# Patient Record
Sex: FEMALE | Race: WHITE | NOT HISPANIC OR LATINO | Employment: UNEMPLOYED | URBAN - METROPOLITAN AREA
[De-identification: names, ages, dates, MRNs, and addresses within clinical notes are randomized per-mention and may not be internally consistent; named-entity substitution may affect disease eponyms.]

---

## 2021-01-29 ENCOUNTER — OFFICE VISIT (OUTPATIENT)
Dept: OBSTETRICS AND GYNECOLOGY | Facility: CLINIC | Age: 26
End: 2021-01-29

## 2021-01-29 VITALS
BODY MASS INDEX: 20.1 KG/M2 | HEIGHT: 66 IN | DIASTOLIC BLOOD PRESSURE: 64 MMHG | WEIGHT: 125.1 LBS | SYSTOLIC BLOOD PRESSURE: 102 MMHG

## 2021-01-29 DIAGNOSIS — O26.849 UTERINE SIZE DATE DISCREPANCY PREGNANCY: ICD-10-CM

## 2021-01-29 DIAGNOSIS — Z34.90 PREGNANCY, UNSPECIFIED GESTATIONAL AGE: ICD-10-CM

## 2021-01-29 DIAGNOSIS — Z01.419 ENCOUNTER FOR GYNECOLOGICAL EXAMINATION: Primary | ICD-10-CM

## 2021-01-29 DIAGNOSIS — O21.0 MILD HYPEREMESIS GRAVIDARUM: ICD-10-CM

## 2021-01-29 DIAGNOSIS — O20.0 THREATENED ABORTION: ICD-10-CM

## 2021-01-29 PROCEDURE — 99214 OFFICE O/P EST MOD 30 MIN: CPT | Performed by: OBSTETRICS & GYNECOLOGY

## 2021-01-29 PROCEDURE — 99395 PREV VISIT EST AGE 18-39: CPT | Performed by: OBSTETRICS & GYNECOLOGY

## 2021-01-29 RX ORDER — PRENATAL VIT,CAL 76/IRON/FOLIC 29 MG-1 MG
1 TABLET ORAL DAILY
Qty: 30 TABLET | Refills: 11 | Status: SHIPPED | OUTPATIENT
Start: 2021-01-29 | End: 2021-02-28

## 2021-01-29 RX ORDER — PROMETHAZINE HYDROCHLORIDE 25 MG/1
25 TABLET ORAL EVERY 6 HOURS PRN
Qty: 24 TABLET | Refills: 2 | Status: SHIPPED | OUTPATIENT
Start: 2021-01-29 | End: 2021-09-07 | Stop reason: HOSPADM

## 2021-01-29 NOTE — PROGRESS NOTES
GYN Annual Exam     CC - Here for annual exam.        HPI  Brittny Hill is a 25 y.o. female, , who presents for annual well woman exam. Patient's last menstrual period was 2020..  Periods are regular every 25-35 days, lasting 7 days. .  Dysmenorrhea:moderate, occurring first 1-2 days of flow.  Patient reports problems with: none.  Partner Status: Marital Status: .  Desires STD Screening: no.  Patient states that her periods are usually regular every 28 days however ultrasound today shows that she is 6 weeks and 3 days and she would have been 10 weeks by her last menstrual period.    Additional OB/GYN History   Current contraception: contraceptive methods: None  Desires to: do not start contraception  Last Pap : 2018 per pt and normal  Last Completed Pap Smear       Status Date      PAP SMEAR No completions recorded        History of abnormal Pap smear: no  Family history of uterine, colon, breast, or ovarian cancer: no  Performs monthly Self-Breast Exam: no  Exercises Regularly:yes  Feelings of Anxiety or Depression: no  Tobacco Usage?: No   OB History        2    Para   1    Term   1       0    AB   0    Living   1       SAB   0    TAB   0    Ectopic   0    Molar   0    Multiple   0    Live Births   1                Health Maintenance   Topic Date Due   • Annual Gynecologic Pelvic and Breast Exam  1996   • ANNUAL PHYSICAL  1999   • HPV VACCINES (1 - 2-dose series) 2007   • TDAP/TD VACCINES (1 - Tdap) 2015   • INFLUENZA VACCINE  2020   • HEPATITIS C SCREENING  2021   • CHLAMYDIA SCREENING  2021   • PAP SMEAR  2021   • Pneumococcal Vaccine 0-64  Aged Out   • MENINGOCOCCAL VACCINE  Aged Out       The additional following portions of the patient's history were reviewed and updated as appropriate: allergies, current medications, past family history, past medical history, past social history, past surgical history and problem  "list.    Review of Systems   Constitutional: Negative for chills and fever.   Respiratory: Negative for shortness of breath.    Cardiovascular: Negative for chest pain.   Gastrointestinal: Positive for nausea and vomiting. Negative for abdominal distention, abdominal pain and diarrhea.   Genitourinary: Negative for breast discharge, breast lump, dysuria, pelvic pain, pelvic pressure, vaginal bleeding and vaginal discharge.   All other systems reviewed and are negative.    All other systems reviewed and are negative.     I have reviewed and agree with the HPI, ROS, and historical information as entered above. Brandon Holm MD    Objective   /64 (BP Location: Left arm, Patient Position: Sitting, Cuff Size: Adult)   Ht 167.6 cm (66\")   Wt 56.7 kg (125 lb 1.6 oz)   LMP 11/20/2020   Breastfeeding No   BMI 20.19 kg/m²     Physical Exam  Vitals signs and nursing note reviewed. Exam conducted with a chaperone present.   Constitutional:       Appearance: She is well-developed.   HENT:      Head: Normocephalic and atraumatic.   Neck:      Musculoskeletal: Normal range of motion. No muscular tenderness.      Thyroid: No thyroid mass or thyromegaly.   Cardiovascular:      Rate and Rhythm: Normal rate and regular rhythm.      Heart sounds: No murmur.   Pulmonary:      Effort: Pulmonary effort is normal. No retractions.      Breath sounds: Normal breath sounds. No wheezing, rhonchi or rales.   Chest:      Chest wall: No mass or tenderness.      Breasts:         Right: Normal. No mass, nipple discharge, skin change or tenderness.         Left: Normal. No mass, nipple discharge, skin change or tenderness.   Abdominal:      General: Bowel sounds are normal.      Palpations: Abdomen is soft. Abdomen is not rigid. There is no mass.      Tenderness: There is no abdominal tenderness. There is no guarding.      Hernia: No hernia is present. There is no hernia in the left inguinal area.   Genitourinary:     Labia:         " Right: No rash, tenderness or lesion.         Left: No rash, tenderness or lesion.       Vagina: Normal. No vaginal discharge or lesions.      Cervix: No cervical motion tenderness, discharge, lesion or cervical bleeding.      Uterus: Normal. Not enlarged, not fixed and not tender.       Adnexa:         Right: No mass or tenderness.          Left: No mass or tenderness.        Rectum: No external hemorrhoid.      Comments: Uterus anteverted with approxi-7-week size.  Neurological:      Mental Status: She is alert and oriented to person, place, and time.   Psychiatric:         Behavior: Behavior normal.            Assessment and Plan    Problem List Items Addressed This Visit     Pregnancy    Overview     Dates by 6 3/7 wk u/s.   Repeat u/s at 9 wks.   Prior  2018 ; boy; 7 pounds 10 ounces         Relevant Orders    US Ob Transvaginal    CBC (No Diff)    ABO / Rh    Progesterone    Uterine size date discrepancy pregnancy    Relevant Orders    US Ob Transvaginal    Mild hyperemesis gravidarum    Overview     Discussed OTC Unisom and Vit B6  Rx phenergan given.            Other Visit Diagnoses     Encounter for gynecological examination    -  Primary    Relevant Orders    Pap IG, Ct-Ng TV Rfx HPV All    Threatened               1. GYN annual well woman exam.  Pap with GC chlamydia performed.    2. Pregnancy.  Patient's last menstrual period was 2020.  Ultrasound today shows viable intrauterine pregnancy measuring 6 weeks and 3 days.  Repeat ultrasound in 3 weeks and new OB labs and teaching after ultrasound.  3. Reviewed monthly self breast exams.  Instructed to call with lumps, pain, or breast discharge.    4. Reccommended Flu Vaccine in Fall of each year.  5. Other: Rx prenatal vitamin.      Brandon Holm MD  2021

## 2021-01-29 NOTE — PROGRESS NOTES
"Chief Complaint   Patient presents with   • TAB   • Gynecologic Exam          HPI  Brittny Hill is a 25 y.o. female, , who presents with being 6w 3d pregnant.    Recent Tests:  She had a urine pregnancy test that was done 2 weeks ago that was positive..  She had a pelvic ultrasound today and the findings were fetal pole with heartbeat >100..  She has not had prenatal care.  She denies associated abdominal or pelvic pain.. Her past medical history is non-contributory.  She does not have passage of tissue.  Rh Status: Unknown  She reports no additional symptoms or complaints.    The additional following portions of the patient's history were reviewed and updated as appropriate: allergies, current medications, past family history, past medical history, past social history, past surgical history and problem list.    Review of Systems   Constitutional: Negative for unexpected weight gain and unexpected weight loss.   Gastrointestinal: Positive for nausea and vomiting. Negative for abdominal distention and abdominal pain.   Genitourinary: Negative for pelvic pain, pelvic pressure, vaginal bleeding and vaginal discharge.   All other systems reviewed and are negative.    All other systems reviewed and are negative.     I have reviewed and agree with the HPI, ROS, and historical information as entered above. Brandon Holm MD    Objective   /64 (BP Location: Left arm, Patient Position: Sitting, Cuff Size: Adult)   Ht 167.6 cm (66\")   Wt 56.7 kg (125 lb 1.6 oz)   LMP 2020   Breastfeeding No   BMI 20.19 kg/m²     Physical Exam  Vitals signs and nursing note reviewed. Exam conducted with a chaperone present.   Constitutional:       Appearance: She is well-developed.   HENT:      Head: Normocephalic and atraumatic.   Neck:      Musculoskeletal: Normal range of motion. No muscular tenderness.      Thyroid: No thyroid mass or thyromegaly.   Cardiovascular:      Rate and Rhythm: Normal rate and " regular rhythm.      Heart sounds: No murmur.   Pulmonary:      Effort: Pulmonary effort is normal. No retractions.      Breath sounds: Normal breath sounds. No wheezing, rhonchi or rales.   Chest:      Chest wall: No mass or tenderness.      Breasts:         Right: Normal. No mass, nipple discharge, skin change or tenderness.         Left: Normal. No mass, nipple discharge, skin change or tenderness.   Abdominal:      General: Bowel sounds are normal.      Palpations: Abdomen is soft. Abdomen is not rigid. There is no mass.      Tenderness: There is no abdominal tenderness. There is no guarding.      Hernia: No hernia is present. There is no hernia in the left inguinal area.   Genitourinary:     Labia:         Right: No rash, tenderness or lesion.         Left: No rash, tenderness or lesion.       Vagina: Normal. No vaginal discharge or lesions.      Cervix: No cervical motion tenderness, discharge, lesion or cervical bleeding.      Uterus: Normal. Not enlarged, not fixed and not tender.       Adnexa:         Right: No mass or tenderness.          Left: No mass or tenderness.        Rectum: No external hemorrhoid.      Comments: Uterus anteverted approximately 7 weeks size.  No uterine or adnexal tenderness.  Neurological:      Mental Status: She is alert and oriented to person, place, and time.   Psychiatric:         Behavior: Behavior normal.            Assessment and Plan    Problem List Items Addressed This Visit     Pregnancy    Overview     Dates by 6 3/7 wk u/s.   Repeat u/s at 9 wks.   Prior  2018 ; boy; 7 pounds 10 ounces         Relevant Orders    US Ob Transvaginal    CBC (No Diff)    ABO / Rh    Progesterone    Uterine size date discrepancy pregnancy    Relevant Orders    US Ob Transvaginal    Mild hyperemesis gravidarum    Overview     Discussed OTC Unisom and Vit B6  Rx phenergan given.            Other Visit Diagnoses     Encounter for gynecological examination    -  Primary    Relevant Orders     Pap IG, Ct-Ng TV Rfx HPV All    Threatened               1. Threatened AB and Size less than dates.  Ultrasound shows viable 6-week and 3-day pregnancy  2. Repeat U/S in 3 week(s).  Will need new OB labs and teaching and urine culture after ultrasound.  Return in about 3 weeks (around 2021) for Follow-up ultrasound.    Counseling was given to patient for the following topics: diagnostic results and patient and family education .       Brandon Holm MD  2021

## 2021-01-30 LAB
ABO GROUP BLD: NORMAL
ERYTHROCYTE [DISTWIDTH] IN BLOOD BY AUTOMATED COUNT: 12 % (ref 12.3–15.4)
HCT VFR BLD AUTO: 37.6 % (ref 34–46.6)
HGB BLD-MCNC: 12.6 G/DL (ref 12–15.9)
MCH RBC QN AUTO: 30.4 PG (ref 26.6–33)
MCHC RBC AUTO-ENTMCNC: 33.5 G/DL (ref 31.5–35.7)
MCV RBC AUTO: 90.8 FL (ref 79–97)
PLATELET # BLD AUTO: 248 10*3/MM3 (ref 140–450)
PROGEST SERPL-MCNC: 10.9 NG/ML
RBC # BLD AUTO: 4.14 10*6/MM3 (ref 3.77–5.28)
RH BLD: POSITIVE
WBC # BLD AUTO: 5.56 10*3/MM3 (ref 3.4–10.8)

## 2021-02-02 ENCOUNTER — TELEPHONE (OUTPATIENT)
Dept: OBSTETRICS AND GYNECOLOGY | Facility: CLINIC | Age: 26
End: 2021-02-02

## 2021-02-02 PROBLEM — O20.0 THREATENED ABORTION: Status: ACTIVE | Noted: 2021-02-02

## 2021-02-10 DIAGNOSIS — Z01.419 ENCOUNTER FOR GYNECOLOGICAL EXAMINATION: ICD-10-CM

## 2021-02-19 ENCOUNTER — INITIAL PRENATAL (OUTPATIENT)
Dept: OBSTETRICS AND GYNECOLOGY | Facility: CLINIC | Age: 26
End: 2021-02-19

## 2021-02-19 VITALS — BODY MASS INDEX: 19.69 KG/M2 | WEIGHT: 122 LBS | SYSTOLIC BLOOD PRESSURE: 98 MMHG | DIASTOLIC BLOOD PRESSURE: 56 MMHG

## 2021-02-19 DIAGNOSIS — Z12.4 SCREENING FOR CERVICAL CANCER: ICD-10-CM

## 2021-02-19 DIAGNOSIS — O20.0 THREATENED ABORTION: ICD-10-CM

## 2021-02-19 DIAGNOSIS — O26.849 UTERINE SIZE DATE DISCREPANCY PREGNANCY: ICD-10-CM

## 2021-02-19 DIAGNOSIS — Z34.90 PREGNANCY, UNSPECIFIED GESTATIONAL AGE: ICD-10-CM

## 2021-02-19 DIAGNOSIS — O21.0 MILD HYPEREMESIS GRAVIDARUM: ICD-10-CM

## 2021-02-19 DIAGNOSIS — Z34.91 INITIAL OBSTETRIC VISIT IN FIRST TRIMESTER: Primary | ICD-10-CM

## 2021-02-19 PROCEDURE — 99214 OFFICE O/P EST MOD 30 MIN: CPT | Performed by: OBSTETRICS & GYNECOLOGY

## 2021-02-19 RX ORDER — SWAB
SWAB, NON-MEDICATED MISCELLANEOUS
COMMUNITY
Start: 2021-02-17 | End: 2023-02-08

## 2021-02-19 RX ORDER — CALCIUM CARBONATE, CHOLECALCIFEROL, MAGNESIUM OXIDE, BORON, FOLIC ACID, PYRIDOXINE HYDROCHLORIDE, CYANOCOBALAMIN, BIOTIN AND BILBERRY 500; 300; 50; 250; 1; 10; 125; 280; 25 MG/1; [IU]/1; MG/1; UG/1; MG/1; MG/1; MG/1; UG/1; MG/1
1 TABLET, CHEWABLE ORAL DAILY
Qty: 30 TABLET | Refills: 12 | Status: SHIPPED | OUTPATIENT
Start: 2021-02-19 | End: 2021-09-07 | Stop reason: HOSPADM

## 2021-02-21 LAB
ABO GROUP BLD: NORMAL
AMPHETAMINES UR QL SCN: NEGATIVE NG/ML
BACTERIA UR CULT: NORMAL
BACTERIA UR CULT: NORMAL
BARBITURATES UR QL SCN: NEGATIVE NG/ML
BASOPHILS # BLD AUTO: 0 X10E3/UL (ref 0–0.2)
BASOPHILS NFR BLD AUTO: 0 %
BENZODIAZ UR QL SCN: NEGATIVE NG/ML
BLD GP AB SCN SERPL QL: NEGATIVE
BZE UR QL SCN: NEGATIVE NG/ML
CANNABINOIDS UR QL SCN: NEGATIVE NG/ML
CREAT UR-MCNC: 204.2 MG/DL (ref 20–300)
EOSINOPHIL # BLD AUTO: 0 X10E3/UL (ref 0–0.4)
EOSINOPHIL NFR BLD AUTO: 0 %
ERYTHROCYTE [DISTWIDTH] IN BLOOD BY AUTOMATED COUNT: 12 % (ref 11.7–15.4)
HBV SURFACE AG SERPL QL IA: NEGATIVE
HCT VFR BLD AUTO: 36.7 % (ref 34–46.6)
HCV AB S/CO SERPL IA: <0.1 S/CO RATIO (ref 0–0.9)
HGB BLD-MCNC: 12.2 G/DL (ref 11.1–15.9)
HIV 1+2 AB+HIV1 P24 AG SERPL QL IA: NON REACTIVE
IMM GRANULOCYTES # BLD AUTO: 0 X10E3/UL (ref 0–0.1)
IMM GRANULOCYTES NFR BLD AUTO: 0 %
LABORATORY COMMENT REPORT: NORMAL
LYMPHOCYTES # BLD AUTO: 1.1 X10E3/UL (ref 0.7–3.1)
LYMPHOCYTES NFR BLD AUTO: 17 %
MCH RBC QN AUTO: 30 PG (ref 26.6–33)
MCHC RBC AUTO-ENTMCNC: 33.2 G/DL (ref 31.5–35.7)
MCV RBC AUTO: 90 FL (ref 79–97)
METHADONE UR QL SCN: NEGATIVE NG/ML
MONOCYTES # BLD AUTO: 0.4 X10E3/UL (ref 0.1–0.9)
MONOCYTES NFR BLD AUTO: 6 %
NEUTROPHILS # BLD AUTO: 4.6 X10E3/UL (ref 1.4–7)
NEUTROPHILS NFR BLD AUTO: 77 %
OPIATES UR QL SCN: NEGATIVE NG/ML
OXYCODONE+OXYMORPHONE UR QL SCN: NEGATIVE NG/ML
PCP UR QL: NEGATIVE NG/ML
PH UR: 5.7 [PH] (ref 4.5–8.9)
PLATELET # BLD AUTO: 232 X10E3/UL (ref 150–450)
PROPOXYPH UR QL SCN: NEGATIVE NG/ML
RBC # BLD AUTO: 4.07 X10E6/UL (ref 3.77–5.28)
RH BLD: POSITIVE
RPR SER QL: NON REACTIVE
RUBV IGG SERPL IA-ACNC: 2.67 INDEX
WBC # BLD AUTO: 6 X10E3/UL (ref 3.4–10.8)

## 2021-03-12 ENCOUNTER — ROUTINE PRENATAL (OUTPATIENT)
Dept: OBSTETRICS AND GYNECOLOGY | Facility: CLINIC | Age: 26
End: 2021-03-12

## 2021-03-12 VITALS — WEIGHT: 128.6 LBS | BODY MASS INDEX: 20.76 KG/M2 | SYSTOLIC BLOOD PRESSURE: 110 MMHG | DIASTOLIC BLOOD PRESSURE: 60 MMHG

## 2021-03-12 DIAGNOSIS — Z3A.12 12 WEEKS GESTATION OF PREGNANCY: Primary | ICD-10-CM

## 2021-03-12 DIAGNOSIS — O20.0 THREATENED ABORTION: ICD-10-CM

## 2021-03-12 DIAGNOSIS — Z12.4 SCREENING FOR CERVICAL CANCER: ICD-10-CM

## 2021-03-12 DIAGNOSIS — O21.0 MILD HYPEREMESIS GRAVIDARUM: ICD-10-CM

## 2021-03-12 LAB
GLUCOSE UR STRIP-MCNC: NEGATIVE MG/DL
PROT UR STRIP-MCNC: NEGATIVE MG/DL

## 2021-03-12 PROCEDURE — 99213 OFFICE O/P EST LOW 20 MIN: CPT | Performed by: OBSTETRICS & GYNECOLOGY

## 2021-03-12 NOTE — PROGRESS NOTES
OB FOLLOW UP  CC- Here for care of pregnancy        Brittny Hill is a 25 y.o.  12w3d patient being seen today for her obstetrical follow up visit. Patient reports nausea.  Nausea has improved.  No longer taking Phenergan.  Wants cell free DNA test done today.    Her prenatal care is complicated by (and status) :    Patient Active Problem List   Diagnosis   • Pregnancy   • Uterine size date discrepancy pregnancy   • Mild hyperemesis gravidarum   • Threatened    • Screening for cervical cancer       Desires genetic testing?: Yes with Gender  Flu Status: Declines  Ultrasound Today: No.    ROS -   Patient Reports : No Problems  Patient Denies: Loss of Fluid, Vaginal Spotting, Vision Changes, Headaches, Nausea  and Vomiting   Fetal Movement : not yet  All other systems reviewed and are negative.     The additional following portions of the patient's history were reviewed and updated as appropriate: allergies, current medications, past family history, past medical history, past social history, past surgical history and problem list.    I have reviewed and agree with the HPI, ROS, and historical information as entered above. Brandon Holm MD    /60   Wt 58.3 kg (128 lb 9.6 oz)   LMP 2020   BMI 20.76 kg/m²         EXAM:     FHT: 155 BPM   Uterine Size: size equals dates  Pelvic Exam: No       Assessment and Plan    Problem List Items Addressed This Visit     Pregnancy - Primary    Overview     Dates by 6 3/7 wk u/s.   Repeat u/s at 9 wks.   Prior  2018 ; boy; 7 pounds 10 ounces  Pap 21 negative; GC /Chl neg  Maternal blood type O+.  Prenatal  labs normal.  Cell Free DNA; done 3/12>         Relevant Orders    POC Urinalysis Dipstick (Completed)    LafinfnW64 PLUS Core+SCA+ESS - Blood,    Mild hyperemesis gravidarum    Overview     Discussed OTC Unisom and Vit B6  Rx phenergan given.          Threatened     Overview     Low progesterone level.  Rx Prometrium 200 mg  daily.         Screening for cervical cancer    Overview     Pap normal 1/29/2021               1. Pregnancy at 12w3d; Cell free DNA today;.   2. Labs reviewed from New OB Visit.  3. Activity and Exercise discussed.  Return in about 4 weeks (around 4/9/2021) for Next scheduled follow up.    Brandon Holm MD  03/12/2021

## 2021-03-19 ENCOUNTER — TELEPHONE (OUTPATIENT)
Dept: OBSTETRICS AND GYNECOLOGY | Facility: CLINIC | Age: 26
End: 2021-03-19

## 2021-04-12 ENCOUNTER — ROUTINE PRENATAL (OUTPATIENT)
Dept: OBSTETRICS AND GYNECOLOGY | Facility: CLINIC | Age: 26
End: 2021-04-12

## 2021-04-12 VITALS — WEIGHT: 142 LBS | BODY MASS INDEX: 22.92 KG/M2 | SYSTOLIC BLOOD PRESSURE: 110 MMHG | DIASTOLIC BLOOD PRESSURE: 62 MMHG

## 2021-04-12 DIAGNOSIS — O26.849 UTERINE SIZE DATE DISCREPANCY PREGNANCY: ICD-10-CM

## 2021-04-12 DIAGNOSIS — O21.0 MILD HYPEREMESIS GRAVIDARUM: ICD-10-CM

## 2021-04-12 DIAGNOSIS — Z3A.16 16 WEEKS GESTATION OF PREGNANCY: Primary | ICD-10-CM

## 2021-04-12 LAB
GLUCOSE UR STRIP-MCNC: NEGATIVE MG/DL
PROT UR STRIP-MCNC: NEGATIVE MG/DL

## 2021-04-12 PROCEDURE — 99213 OFFICE O/P EST LOW 20 MIN: CPT | Performed by: OBSTETRICS & GYNECOLOGY

## 2021-04-12 NOTE — PROGRESS NOTES
OB FOLLOW UP  CC- Here for care of pregnancy        Brittny Hill is a 26 y.o.  16w6d patient being seen today for her obstetrical follow up visit. Patient reports heartburn.     Her prenatal care is complicated by (and status) :    Patient Active Problem List   Diagnosis   • Pregnancy   • Uterine size date discrepancy pregnancy   • Threatened    • Screening for cervical cancer       Flu Status: Declines  Ultrasound Today: No.    ROS -   Patient Reports : Epigastric Pain  Patient Denies: Loss of Fluid, Vaginal Spotting, Vision Changes, Headaches, Nausea , Vomiting  and Contractions  Fetal Movement : normal  All other systems reviewed and are negative.       The additional following portions of the patient's history were reviewed and updated as appropriate: allergies, current medications, past family history, past medical history, past social history, past surgical history and problem list.    I have reviewed and agree with the HPI, ROS, and historical information as entered above. Brandon Holm MD    /62   Wt 64.4 kg (142 lb)   LMP 2020   BMI 22.92 kg/m²       EXAM:     FHT: 165 BPM   Uterine Size: 17 cm  Pelvic Exam: No    Urine glucose/protein: See prenatal flowsheet       Assessment and Plan    Problem List Items Addressed This Visit     Pregnancy - Primary    Overview     Dates by 6 3/7 wk u/s.   Repeat u/s at 9 wks.   Prior  2018 ; boy; 7 pounds 10 ounces  Pap 21 negative; GC /Chl neg  Maternal blood type O+.  Prenatal  labs normal.  Cell Free DNA; done 3/12> low risk, female.         Relevant Orders    POC Urinalysis Dipstick (Completed)    US Ob 14 + Weeks Single or First Gestation    Uterine size date discrepancy pregnancy    RESOLVED: Mild hyperemesis gravidarum    Overview     Discussed OTC Unisom and Vit B6  Rx phenergan given.                1. Pregnancy at 16w6d;Cell free DNA low risk; female;   2. Fetal status reassuring. Anomaly u/s next visit.    3. Activity and Exercise discussed. Nausea and vomiting resolved.   Return in about 4 weeks (around 5/10/2021) for Next scheduled follow up, Anomaly scan ultrasound.    Brandon Holm MD  04/12/2021

## 2021-04-19 ENCOUNTER — TELEPHONE (OUTPATIENT)
Dept: OBSTETRICS AND GYNECOLOGY | Facility: CLINIC | Age: 26
End: 2021-04-19

## 2021-05-10 ENCOUNTER — ROUTINE PRENATAL (OUTPATIENT)
Dept: OBSTETRICS AND GYNECOLOGY | Facility: CLINIC | Age: 26
End: 2021-05-10

## 2021-05-10 VITALS — BODY MASS INDEX: 23.82 KG/M2 | DIASTOLIC BLOOD PRESSURE: 60 MMHG | SYSTOLIC BLOOD PRESSURE: 105 MMHG | WEIGHT: 147.6 LBS

## 2021-05-10 DIAGNOSIS — R12 HEARTBURN: ICD-10-CM

## 2021-05-10 DIAGNOSIS — Z3A.20 20 WEEKS GESTATION OF PREGNANCY: Primary | ICD-10-CM

## 2021-05-10 LAB
GLUCOSE UR STRIP-MCNC: NEGATIVE MG/DL
PROT UR STRIP-MCNC: NEGATIVE MG/DL

## 2021-05-10 PROCEDURE — 99213 OFFICE O/P EST LOW 20 MIN: CPT | Performed by: OBSTETRICS & GYNECOLOGY

## 2021-05-10 RX ORDER — FAMOTIDINE 20 MG/1
20 TABLET, FILM COATED ORAL 2 TIMES DAILY
Qty: 30 TABLET | Refills: 11 | Status: SHIPPED | OUTPATIENT
Start: 2021-05-10 | End: 2021-06-11 | Stop reason: SDUPTHER

## 2021-05-10 NOTE — PROGRESS NOTES
OB FOLLOW UP  CC- Here for care of pregnancy        Brittny Hill is a 26 y.o.  20w6d patient being seen today for her obstetrical follow up visit. Patient reports backache and fatigue.     Her prenatal care is complicated by (and status) :    Patient Active Problem List   Diagnosis   • Pregnancy   • Uterine size date discrepancy pregnancy   • Threatened    • Screening for cervical cancer   • Heartburn       Flu Status: Declines  Ultrasound Today: Yes.  Findings showed Viable IUP with normal growth.   Anatomy survey complete. Placenta posterior and fundal.  I have personally evaluated the U/S and agree with the findings. Brandon Holm MD    ROS -   Patient Reports : No Problems  Patient Denies: Loss of Fluid, Vaginal Spotting, Vision Changes, Headaches, Nausea , Vomiting , Contractions and Epigastric pain  Fetal Movement : normal  All other systems reviewed and are negative.       The additional following portions of the patient's history were reviewed and updated as appropriate: allergies, current medications, past family history, past medical history, past social history, past surgical history and problem list.    I have reviewed and agree with the HPI, ROS, and historical information as entered above. Brandon Holm MD    /60   Wt 67 kg (147 lb 9.6 oz)   LMP 2020   BMI 23.82 kg/m²       EXAM:     FHT: 55 BPM   Uterine Size: size equals dates  Pelvic Exam: No    Urine glucose/protein: See prenatal flowsheet       Assessment and Plan    Problem List Items Addressed This Visit            Heartburn    Overview     Rx Pepcid 20 mg bid.             Other    Pregnancy - Primary    Overview     Dates by 6 3/7 wk u/s.   Repeat u/s at 9 wks.   Prior  2018 ; boy; 7 pounds 10 ounces  Pap 21 negative; GC /Chl neg  Maternal blood type O+.  Prenatal  labs normal.  Cell Free DNA; done 3/12> low risk, female.         Relevant Orders    POC Urinalysis Dipstick (Completed)           1. Pregnancy at 20w6d; anomaly u/s today complete.   2. Fetal status reassuring.   3. Activity and Exercise discussed. C/o heartburn; rx Pepcid.   Return in about 4 weeks (around 6/7/2021) for Next scheduled follow up.    Brandon Holm MD  05/10/2021

## 2021-05-17 ENCOUNTER — TELEPHONE (OUTPATIENT)
Dept: OBSTETRICS AND GYNECOLOGY | Facility: CLINIC | Age: 26
End: 2021-05-17

## 2021-05-17 NOTE — TELEPHONE ENCOUNTER
Pt's insurance has changed and was having trouble accessing a list of PCP. She was just wanting to get her insurance information.

## 2021-05-27 ENCOUNTER — TELEPHONE (OUTPATIENT)
Dept: OBSTETRICS AND GYNECOLOGY | Facility: CLINIC | Age: 26
End: 2021-05-27

## 2021-05-27 NOTE — TELEPHONE ENCOUNTER
Patient lvm states she is 5 months pregnant and is wondering if she can take Claritin for allergies

## 2021-06-11 ENCOUNTER — ROUTINE PRENATAL (OUTPATIENT)
Dept: OBSTETRICS AND GYNECOLOGY | Facility: CLINIC | Age: 26
End: 2021-06-11

## 2021-06-11 VITALS — SYSTOLIC BLOOD PRESSURE: 102 MMHG | BODY MASS INDEX: 24.57 KG/M2 | DIASTOLIC BLOOD PRESSURE: 62 MMHG | WEIGHT: 152.2 LBS

## 2021-06-11 DIAGNOSIS — Z12.4 SCREENING FOR CERVICAL CANCER: ICD-10-CM

## 2021-06-11 DIAGNOSIS — R12 HEARTBURN: ICD-10-CM

## 2021-06-11 DIAGNOSIS — Z3A.25 25 WEEKS GESTATION OF PREGNANCY: Primary | ICD-10-CM

## 2021-06-11 PROBLEM — K21.9 GASTROESOPHAGEAL REFLUX DISEASE WITHOUT ESOPHAGITIS: Status: ACTIVE | Noted: 2021-06-11

## 2021-06-11 PROBLEM — K21.9 GASTROESOPHAGEAL REFLUX DISEASE WITHOUT ESOPHAGITIS: Status: RESOLVED | Noted: 2021-06-11 | Resolved: 2021-06-11

## 2021-06-11 LAB
GLUCOSE UR STRIP-MCNC: NEGATIVE MG/DL
PROT UR STRIP-MCNC: NEGATIVE MG/DL

## 2021-06-11 PROCEDURE — 99213 OFFICE O/P EST LOW 20 MIN: CPT | Performed by: OBSTETRICS & GYNECOLOGY

## 2021-06-11 RX ORDER — FAMOTIDINE 20 MG/1
20 TABLET, FILM COATED ORAL 2 TIMES DAILY
Qty: 30 TABLET | Refills: 11 | Status: SHIPPED | OUTPATIENT
Start: 2021-06-11 | End: 2021-09-07 | Stop reason: HOSPADM

## 2021-06-11 NOTE — PROGRESS NOTES
OB FOLLOW UP  CC- Here for care of pregnancy        Brittny Hill is a 26 y.o.  25w3d patient being seen today for her obstetrical follow up visit. Patient reports heartburn.     Her prenatal care is complicated by (and status) :    Patient Active Problem List   Diagnosis   • Pregnancy   • Uterine size date discrepancy pregnancy   • Threatened    • Screening for cervical cancer   • Heartburn       Flu Status: Already given in current flu season  Ultrasound Today: No.    ROS -   Patient Reports : Epigastric Pain  Patient Denies: Loss of Fluid, Vaginal Spotting, Vision Changes, Headaches, Nausea , Vomiting  and Contractions  Fetal Movement : normal  All other systems reviewed and are negative.       The additional following portions of the patient's history were reviewed and updated as appropriate: allergies, current medications, past family history, past medical history, past social history, past surgical history and problem list.    I have reviewed and agree with the HPI, ROS, and historical information as entered above. Brandon Holm MD    /62   Wt 69 kg (152 lb 3.2 oz)   LMP 2020   Breastfeeding Unknown   BMI 24.57 kg/m²       EXAM:     FHT: 142 BPM   Uterine Size: 24 cm  Pelvic Exam: No    Urine glucose/protein: See prenatal flowsheet       Assessment and Plan    Problem List Items Addressed This Visit     Pregnancy - Primary    Overview     Dates by 6 3/7 wk u/s.   Repeat u/s at 9 wks.   Prior  2018 ; boy; 7 pounds 10 ounces  Pap 21 negative; GC /Chl neg  Maternal blood type O+.  Prenatal  labs normal.  Cell Free DNA; done 3/12> low risk, female.         Relevant Orders    POC Urinalysis Dipstick (Completed)    Screening for cervical cancer    Overview     Pap normal 2021         Heartburn    Overview     Rx Pepcid 20 mg bid.                1. Pregnancy at 25w3d; 1 hour glucose screen next visit.  2. Fetal status reassuring.   3. Activity and Exercise  discussed.  Return in about 19 days (around 6/30/2021) for Next scheduled follow up.    Brandon Holm MD  06/11/2021

## 2021-07-14 ENCOUNTER — ROUTINE PRENATAL (OUTPATIENT)
Dept: OBSTETRICS AND GYNECOLOGY | Facility: CLINIC | Age: 26
End: 2021-07-14

## 2021-07-14 VITALS — SYSTOLIC BLOOD PRESSURE: 102 MMHG | BODY MASS INDEX: 25.34 KG/M2 | DIASTOLIC BLOOD PRESSURE: 60 MMHG | WEIGHT: 157 LBS

## 2021-07-14 DIAGNOSIS — Z3A.30 30 WEEKS GESTATION OF PREGNANCY: Primary | ICD-10-CM

## 2021-07-14 LAB
GLUCOSE UR STRIP-MCNC: NEGATIVE MG/DL
PROT UR STRIP-MCNC: NEGATIVE MG/DL

## 2021-07-14 PROCEDURE — 99212 OFFICE O/P EST SF 10 MIN: CPT | Performed by: NURSE PRACTITIONER

## 2021-07-14 NOTE — PROGRESS NOTES
OB FOLLOW UP  CC- Here for care of pregnancy        Brittny Hill is a 26 y.o.  30w1d patient being seen today for her obstetrical follow up. Patient reports leg cramps at night x2 weeks . Denies any abnormal vaginal discharge    Patient undergoing Glucola testing today. She is due for her testing at 1110.     MBT: O+  Rhogam Given: N/A  TDAP: declines  Breast Pump: prescription given    Ultrasound Today: No.    Her prenatal care is complicated by (and status) :    Patient Active Problem List   Diagnosis   • Pregnancy   • Uterine size date discrepancy pregnancy   • Threatened    • Screening for cervical cancer   • Heartburn         ROS -   Patient Reports : leg cramps   Patient Denies: Loss of Fluid, Vaginal Spotting, Vision Changes, Headaches and Contractions  Fetal Movement : normal    The additional following portions of the patient's history were reviewed and updated as appropriate: allergies, current medications, past family history, past medical history, past social history, past surgical history and problem list.    I have reviewed and agree with the HPI, ROS, and historical information as entered above. Mary Kimble, APRN    /60   Wt 71.2 kg (157 lb)   LMP 2020   BMI 25.34 kg/m²         EXAM:     FHT: 153 BPM   Uterine Size: size equals dates  Pelvic Exam: No       Assessment and Plan    Problem List Items Addressed This Visit        Gravid and     Pregnancy - Primary    Overview     Dates by 6 3/7 wk u/s.   Repeat u/s at 9 wks.   Prior   ; boy; 7 pounds 10 ounces  Pap 21 negative; GC /Chl neg  Maternal blood type O+.  Prenatal  labs normal.  Cell Free DNA; done 3/12> low risk, female.         Relevant Orders    POC Urinalysis Dipstick (Completed)    Gestational Screen 1 Hr ([facility])    Antibody Screen    CBC (No Diff)          1. Pregnancy at 30w1d  2. Fetal status reassuring.   3. Activity and Exercise discussed.  28 week labs today, MBT= O  positive  RTC in 4 weeks    Mary Kimble, APRN  07/14/2021

## 2021-07-15 LAB
BLD GP AB SCN SERPL QL: NEGATIVE
ERYTHROCYTE [DISTWIDTH] IN BLOOD BY AUTOMATED COUNT: 12.4 % (ref 12.3–15.4)
GLUCOSE 1H P 50 G GLC PO SERPL-MCNC: 97 MG/DL (ref 65–139)
HCT VFR BLD AUTO: 31.1 % (ref 34–46.6)
HGB BLD-MCNC: 10.2 G/DL (ref 12–15.9)
MCH RBC QN AUTO: 27.5 PG (ref 26.6–33)
MCHC RBC AUTO-ENTMCNC: 32.8 G/DL (ref 31.5–35.7)
MCV RBC AUTO: 83.8 FL (ref 79–97)
PLATELET # BLD AUTO: 206 10*3/MM3 (ref 140–450)
RBC # BLD AUTO: 3.71 10*6/MM3 (ref 3.77–5.28)
WBC # BLD AUTO: 6.98 10*3/MM3 (ref 3.4–10.8)

## 2021-07-26 ENCOUNTER — ROUTINE PRENATAL (OUTPATIENT)
Dept: OBSTETRICS AND GYNECOLOGY | Facility: CLINIC | Age: 26
End: 2021-07-26

## 2021-07-26 VITALS — WEIGHT: 160 LBS | DIASTOLIC BLOOD PRESSURE: 72 MMHG | BODY MASS INDEX: 25.82 KG/M2 | SYSTOLIC BLOOD PRESSURE: 116 MMHG

## 2021-07-26 DIAGNOSIS — Z3A.31 31 WEEKS GESTATION OF PREGNANCY: Primary | ICD-10-CM

## 2021-07-26 DIAGNOSIS — R12 HEARTBURN: ICD-10-CM

## 2021-07-26 DIAGNOSIS — Z12.4 SCREENING FOR CERVICAL CANCER: ICD-10-CM

## 2021-07-26 DIAGNOSIS — O26.849 UTERINE SIZE DATE DISCREPANCY PREGNANCY: ICD-10-CM

## 2021-07-26 LAB
GLUCOSE UR STRIP-MCNC: NEGATIVE MG/DL
PROT UR STRIP-MCNC: NEGATIVE MG/DL

## 2021-07-26 PROCEDURE — 99212 OFFICE O/P EST SF 10 MIN: CPT | Performed by: OBSTETRICS & GYNECOLOGY

## 2021-07-26 NOTE — PROGRESS NOTES
OB FOLLOW UP  CC- Here for care of pregnancy        Brittny Hill is a 26 y.o.  31w6d patient being seen today for her obstetrical follow up visit. Patient reports backache, fatigue, headache, heartburn and nausea.     Her prenatal care is complicated by (and status) :    Patient Active Problem List   Diagnosis   • Pregnancy   • Uterine size date discrepancy pregnancy   • Threatened    • Screening for cervical cancer   • Heartburn       Flu Status: Already given in current flu season  Ultrasound Today: No.    ROS -   Patient Reports : Headaches and Nausea  Patient Denies: Loss of Fluid, Vaginal Spotting, Vision Changes, Vomiting , Contractions and Epigastric pain  Fetal Movement : normal  All other systems reviewed and are negative.       The additional following portions of the patient's history were reviewed and updated as appropriate: allergies, current medications, past family history, past medical history, past social history, past surgical history and problem list.    I have reviewed and agree with the HPI, ROS, and historical information as entered above. Brandon Holm MD    /72   Wt 72.6 kg (160 lb)   LMP 2020   BMI 25.82 kg/m²       EXAM:     FHT: 157 BPM   Uterine Size: 30 cm  Pelvic Exam: No    Urine glucose/protein: See prenatal flowsheet       Assessment and Plan    Problem List Items Addressed This Visit            Heartburn    Overview     Rx Pepcid 20 mg bid.             Other    Pregnancy - Primary    Overview     Dates by 6 3/7 wk u/s.   Repeat u/s at 9 wks.   Prior   ; boy; 7 pounds 10 ounces  Pap 21 negative; GC /Chl neg  Maternal blood type O+.  Prenatal  labs normal.  Cell Free DNA; done 3/12> low risk, female.         Relevant Orders    POC Urinalysis Dipstick (Completed)    Uterine size date discrepancy pregnancy    Screening for cervical cancer    Overview     Pap normal 2021               1. Pregnancy at 31w6d; measures small for  gest age; u/s next for growth.   2. Fetal status reassuring.   3. Activity and Exercise discussed.  Return in about 2 weeks (around 8/9/2021) for Next scheduled follow up.    Brandon Holm MD  07/26/2021

## 2021-07-30 ENCOUNTER — TELEPHONE (OUTPATIENT)
Dept: OBSTETRICS AND GYNECOLOGY | Facility: CLINIC | Age: 26
End: 2021-07-30

## 2021-07-30 NOTE — TELEPHONE ENCOUNTER
PT CALLED STATING SHE IS HAVING RIGHT SIDE PAIN, IT IS A SHARP PAIN. SHE STATED THAT IT STARTED THIS MORNING.     Pain is in the RLQ. States it started when she went to get up. States it felt like a pinched nerve. She reports she is unable to walk without leaning forward. Denies vaginal bleeding and reports + FM. Denies regular contractions.

## 2021-07-30 NOTE — TELEPHONE ENCOUNTER
Pt. Denies fever, N/V, dysuria. Discussed with TH: likely muscular, try tylenol, warm bath, and rest. If worsens or other sx develop come in L&D over the weekend. Pt. VU

## 2021-08-09 ENCOUNTER — ROUTINE PRENATAL (OUTPATIENT)
Dept: OBSTETRICS AND GYNECOLOGY | Facility: CLINIC | Age: 26
End: 2021-08-09

## 2021-08-09 VITALS — WEIGHT: 161 LBS | BODY MASS INDEX: 25.99 KG/M2 | DIASTOLIC BLOOD PRESSURE: 70 MMHG | SYSTOLIC BLOOD PRESSURE: 124 MMHG

## 2021-08-09 DIAGNOSIS — Z3A.33 33 WEEKS GESTATION OF PREGNANCY: Primary | ICD-10-CM

## 2021-08-09 LAB
GLUCOSE UR STRIP-MCNC: NEGATIVE MG/DL
PROT UR STRIP-MCNC: NEGATIVE MG/DL

## 2021-08-09 PROCEDURE — 99213 OFFICE O/P EST LOW 20 MIN: CPT | Performed by: NURSE PRACTITIONER

## 2021-08-09 NOTE — PROGRESS NOTES
OB FOLLOW UP  CC- Here for care of pregnancy        Brittny Hill is a 26 y.o.  33w6d patient being seen today for her obstetrical follow up visit. Patient reports no complaints currently. She reports she went to local ER last week for severe RLQ pain, they ruled out appendicitis, and everything checked out. She states pain has since resolved.     Her prenatal care is complicated by (and status) :    Patient Active Problem List   Diagnosis   • Pregnancy   • Uterine size date discrepancy pregnancy   • Threatened    • Screening for cervical cancer   • Heartburn     Ultrasound Today: Yes.      ROS -   Patient Reports : No Problems  Patient Denies: Loss of Fluid, Vaginal Spotting, Vision Changes, Headaches and Contractions  Fetal Movement : normal  All other systems reviewed and are negative.       The additional following portions of the patient's history were reviewed and updated as appropriate: allergies, current medications, past family history, past medical history, past social history, past surgical history and problem list.    I have reviewed and agree with the HPI, ROS, and historical information as entered above. Leticia Rivera, APRN    /70   Wt 73 kg (161 lb)   LMP 2020   BMI 25.99 kg/m²       EXAM:     FHT: 148 BPM   Uterine Size: 33 cm  Pelvic Exam: No    Urine glucose/protein: See prenatal flowsheet       Assessment and Plan    Problem List Items Addressed This Visit        Gravid and     Pregnancy - Primary    Overview     Dates by 6 3/7 wk u/s.   Repeat u/s at 9 wks.   Prior   ; boy; 7 pounds 10 ounces  Pap 21 negative; GC /Chl neg  Maternal blood type O+.  Prenatal  labs normal.  Cell Free DNA; done 3/12> low risk, female.         Relevant Orders    POC Urinalysis Dipstick (Completed)          1. Pregnancy at 33w6d  2. Fetal status reassuring. U/S today, EFW 51%.   3. Kick counts reviewed.   Return in about 2 weeks (around 2021) for with  [place].    Leticia Rivera, APRN  08/09/2021

## 2021-08-30 ENCOUNTER — ROUTINE PRENATAL (OUTPATIENT)
Dept: OBSTETRICS AND GYNECOLOGY | Facility: CLINIC | Age: 26
End: 2021-08-30

## 2021-08-30 VITALS — WEIGHT: 164 LBS | DIASTOLIC BLOOD PRESSURE: 64 MMHG | SYSTOLIC BLOOD PRESSURE: 118 MMHG | BODY MASS INDEX: 26.47 KG/M2

## 2021-08-30 DIAGNOSIS — Z34.90 PREGNANCY, UNSPECIFIED GESTATIONAL AGE: Primary | ICD-10-CM

## 2021-08-30 DIAGNOSIS — R12 HEARTBURN: ICD-10-CM

## 2021-08-30 DIAGNOSIS — O26.849 UTERINE SIZE DATE DISCREPANCY PREGNANCY: ICD-10-CM

## 2021-08-30 LAB
GLUCOSE UR STRIP-MCNC: NEGATIVE MG/DL
PROT UR STRIP-MCNC: NEGATIVE MG/DL

## 2021-08-30 PROCEDURE — 99213 OFFICE O/P EST LOW 20 MIN: CPT | Performed by: OBSTETRICS & GYNECOLOGY

## 2021-08-30 PROCEDURE — 87081 CULTURE SCREEN ONLY: CPT | Performed by: OBSTETRICS & GYNECOLOGY

## 2021-08-30 NOTE — PROGRESS NOTES
OB FOLLOW UP  CC- Here for care of pregnancy        Brittny Hill is a 26 y.o.  36w6d patient being seen today for her obstetrical follow up visit. Patient reports backache, fatigue and heartburn.     Her prenatal care is complicated by (and status) :    Patient Active Problem List   Diagnosis   • Pregnancy   • Uterine size date discrepancy pregnancy   • Threatened    • Screening for cervical cancer   • Heartburn       Flu Status: Already given in current flu season  Ultrasound Today: No.    ROS -   Patient Reports : No Problems  Patient Denies: Loss of Fluid, Vaginal Spotting, Vision Changes, Headaches, Nausea , Vomiting , Contractions and Epigastric pain  Fetal Movement : normal  All other systems reviewed and are negative.       The additional following portions of the patient's history were reviewed and updated as appropriate: allergies, current medications, past family history, past medical history, past social history, past surgical history and problem list.    I have reviewed and agree with the HPI, ROS, and historical information as entered above. Brandon Holm MD    /64   Wt 74.4 kg (164 lb)   LMP 2020   BMI 26.47 kg/m²       EXAM:     FHT: 150 BPM   Uterine Size: 34 cm  Pelvic Exam: Yes.  Presentation: cephalic. Dilation: Closed. Effacement: 50%. Station: -3.    Urine glucose/protein: See prenatal flowsheet       Assessment and Plan    Problem List Items Addressed This Visit            Heartburn    Overview     Rx Pepcid 20 mg bid.             Other    Pregnancy - Primary    Overview     Dates by 6 3/7 wk u/s.   Repeat u/s at 9 wks.   Prior   ; boy; 7 pounds 10 ounces  Pap 21 negative; GC /Chl neg  Maternal blood type O+.  Prenatal  labs normal.  Cell Free DNA; done 3/12> low risk, female.         Relevant Orders    POC Urinalysis Dipstick    Group B Streptococcus Culture - Swab, Vaginal/Rectum    Uterine size date discrepancy pregnancy    Overview      Measures small for dates;   U/s for growth at 34 wk. > 51%               1. Pregnancy at 36w6d; GBS done today;  2. Fetal status reassuring.   Cx FT/ 60%/-3.   3. Activity and Exercise discussed.  Return in about 1 week (around 9/6/2021) for Next scheduled follow up.    Brandon Holm MD  08/30/2021

## 2021-09-01 PROBLEM — O99.820 GBS (GROUP B STREPTOCOCCUS CARRIER), +RV CULTURE, CURRENTLY PREGNANT: Status: ACTIVE | Noted: 2021-09-01

## 2021-09-01 LAB — BACTERIA SPEC AEROBE CULT: ABNORMAL

## 2021-09-05 ENCOUNTER — HOSPITAL ENCOUNTER (INPATIENT)
Facility: HOSPITAL | Age: 26
LOS: 2 days | Discharge: HOME | End: 2021-09-07
Attending: OBSTETRICS & GYNECOLOGY | Admitting: OBSTETRICS & GYNECOLOGY

## 2021-09-05 PROBLEM — Z37.9 NORMAL LABOR: Status: ACTIVE | Noted: 2021-09-05

## 2021-09-05 LAB
ABO GROUP BLD: NORMAL
ABO GROUP BLD: NORMAL
ALP SERPL-CCNC: 98 U/L (ref 39–117)
ALT SERPL W P-5'-P-CCNC: 7 U/L (ref 1–33)
AST SERPL-CCNC: 15 U/L (ref 1–32)
BILIRUB SERPL-MCNC: <0.2 MG/DL (ref 0–1.2)
BLD GP AB SCN SERPL QL: NEGATIVE
CREAT SERPL-MCNC: 0.54 MG/DL (ref 0.57–1)
DEPRECATED RDW RBC AUTO: 41.4 FL (ref 37–54)
ERYTHROCYTE [DISTWIDTH] IN BLOOD BY AUTOMATED COUNT: 14.1 % (ref 12.3–15.4)
HCT VFR BLD AUTO: 32.4 % (ref 34–46.6)
HGB BLD-MCNC: 10.7 G/DL (ref 12–15.9)
LDH SERPL-CCNC: 160 U/L (ref 135–214)
MCH RBC QN AUTO: 26.7 PG (ref 26.6–33)
MCHC RBC AUTO-ENTMCNC: 33 G/DL (ref 31.5–35.7)
MCV RBC AUTO: 80.8 FL (ref 79–97)
PLATELET # BLD AUTO: 227 10*3/MM3 (ref 140–450)
PMV BLD AUTO: 10.2 FL (ref 6–12)
RBC # BLD AUTO: 4.01 10*6/MM3 (ref 3.77–5.28)
RH BLD: POSITIVE
RH BLD: POSITIVE
SARS-COV-2 RDRP RESP QL NAA+PROBE: NORMAL
T&S EXPIRATION DATE: NORMAL
URATE SERPL-MCNC: 4.2 MG/DL (ref 2.4–5.7)
WBC # BLD AUTO: 9.34 10*3/MM3 (ref 3.4–10.8)

## 2021-09-05 PROCEDURE — 86850 RBC ANTIBODY SCREEN: CPT | Performed by: OBSTETRICS & GYNECOLOGY

## 2021-09-05 PROCEDURE — 86900 BLOOD TYPING SEROLOGIC ABO: CPT | Performed by: OBSTETRICS & GYNECOLOGY

## 2021-09-05 PROCEDURE — 85027 COMPLETE CBC AUTOMATED: CPT | Performed by: OBSTETRICS & GYNECOLOGY

## 2021-09-05 PROCEDURE — 84075 ASSAY ALKALINE PHOSPHATASE: CPT | Performed by: OBSTETRICS & GYNECOLOGY

## 2021-09-05 PROCEDURE — 84550 ASSAY OF BLOOD/URIC ACID: CPT | Performed by: OBSTETRICS & GYNECOLOGY

## 2021-09-05 PROCEDURE — 82565 ASSAY OF CREATININE: CPT | Performed by: OBSTETRICS & GYNECOLOGY

## 2021-09-05 PROCEDURE — 86901 BLOOD TYPING SEROLOGIC RH(D): CPT

## 2021-09-05 PROCEDURE — 87635 SARS-COV-2 COVID-19 AMP PRB: CPT | Performed by: OBSTETRICS & GYNECOLOGY

## 2021-09-05 PROCEDURE — 86901 BLOOD TYPING SEROLOGIC RH(D): CPT | Performed by: OBSTETRICS & GYNECOLOGY

## 2021-09-05 PROCEDURE — 82247 BILIRUBIN TOTAL: CPT | Performed by: OBSTETRICS & GYNECOLOGY

## 2021-09-05 PROCEDURE — 59409 OBSTETRICAL CARE: CPT | Performed by: OBSTETRICS & GYNECOLOGY

## 2021-09-05 PROCEDURE — 86900 BLOOD TYPING SEROLOGIC ABO: CPT

## 2021-09-05 PROCEDURE — 59025 FETAL NON-STRESS TEST: CPT

## 2021-09-05 PROCEDURE — 25010000002 METHYLERGONOVINE MALEATE PER 0.2 MG

## 2021-09-05 PROCEDURE — 25010000002 PENICILLIN G POTASSIUM PER 600000 UNITS: Performed by: OBSTETRICS & GYNECOLOGY

## 2021-09-05 PROCEDURE — 84450 TRANSFERASE (AST) (SGOT): CPT | Performed by: OBSTETRICS & GYNECOLOGY

## 2021-09-05 PROCEDURE — 83615 LACTATE (LD) (LDH) ENZYME: CPT | Performed by: OBSTETRICS & GYNECOLOGY

## 2021-09-05 PROCEDURE — 84460 ALANINE AMINO (ALT) (SGPT): CPT | Performed by: OBSTETRICS & GYNECOLOGY

## 2021-09-05 RX ORDER — LIDOCAINE HYDROCHLORIDE 10 MG/ML
INJECTION, SOLUTION INFILTRATION; PERINEURAL
Status: DISCONTINUED
Start: 2021-09-05 | End: 2021-09-07 | Stop reason: HOSPADM

## 2021-09-05 RX ORDER — PENICILLIN G 3000000 [IU]/50ML
3 INJECTION, SOLUTION INTRAVENOUS EVERY 4 HOURS
Status: DISCONTINUED | OUTPATIENT
Start: 2021-09-05 | End: 2021-09-05 | Stop reason: HOSPADM

## 2021-09-05 RX ORDER — OXYTOCIN-SODIUM CHLORIDE 0.9% IV SOLN 30 UNIT/500ML 30-0.9/5 UT/ML-%
85 SOLUTION INTRAVENOUS ONCE
Status: DISCONTINUED | OUTPATIENT
Start: 2021-09-05 | End: 2021-09-05 | Stop reason: HOSPADM

## 2021-09-05 RX ORDER — HYDROCODONE BITARTRATE AND ACETAMINOPHEN 5; 325 MG/1; MG/1
1 TABLET ORAL EVERY 4 HOURS PRN
Status: DISCONTINUED | OUTPATIENT
Start: 2021-09-05 | End: 2021-09-07 | Stop reason: HOSPADM

## 2021-09-05 RX ORDER — ONDANSETRON 4 MG/1
4 TABLET, FILM COATED ORAL EVERY 6 HOURS PRN
Status: DISCONTINUED | OUTPATIENT
Start: 2021-09-05 | End: 2021-09-05 | Stop reason: HOSPADM

## 2021-09-05 RX ORDER — BISACODYL 10 MG
10 SUPPOSITORY, RECTAL RECTAL DAILY PRN
Status: DISCONTINUED | OUTPATIENT
Start: 2021-09-06 | End: 2021-09-07 | Stop reason: HOSPADM

## 2021-09-05 RX ORDER — ONDANSETRON 4 MG/1
4 TABLET, FILM COATED ORAL EVERY 6 HOURS PRN
Status: DISCONTINUED | OUTPATIENT
Start: 2021-09-05 | End: 2021-09-07 | Stop reason: HOSPADM

## 2021-09-05 RX ORDER — SODIUM CHLORIDE 0.9 % (FLUSH) 0.9 %
10 SYRINGE (ML) INJECTION AS NEEDED
Status: DISCONTINUED | OUTPATIENT
Start: 2021-09-05 | End: 2021-09-05 | Stop reason: HOSPADM

## 2021-09-05 RX ORDER — HYDROCORTISONE 25 MG/G
1 CREAM TOPICAL AS NEEDED
Status: DISCONTINUED | OUTPATIENT
Start: 2021-09-05 | End: 2021-09-07 | Stop reason: HOSPADM

## 2021-09-05 RX ORDER — OXYTOCIN-SODIUM CHLORIDE 0.9% IV SOLN 30 UNIT/500ML 30-0.9/5 UT/ML-%
2-20 SOLUTION INTRAVENOUS
Status: DISCONTINUED | OUTPATIENT
Start: 2021-09-05 | End: 2021-09-05

## 2021-09-05 RX ORDER — METHYLERGONOVINE MALEATE 0.2 MG/ML
200 INJECTION INTRAVENOUS ONCE AS NEEDED
Status: DISCONTINUED | OUTPATIENT
Start: 2021-09-05 | End: 2021-09-05 | Stop reason: HOSPADM

## 2021-09-05 RX ORDER — OXYTOCIN-SODIUM CHLORIDE 0.9% IV SOLN 30 UNIT/500ML 30-0.9/5 UT/ML-%
650 SOLUTION INTRAVENOUS ONCE
Status: COMPLETED | OUTPATIENT
Start: 2021-09-05 | End: 2021-09-05

## 2021-09-05 RX ORDER — ERYTHROMYCIN 5 MG/G
OINTMENT OPHTHALMIC
Status: DISCONTINUED
Start: 2021-09-05 | End: 2021-09-07 | Stop reason: HOSPADM

## 2021-09-05 RX ORDER — CARBOPROST TROMETHAMINE 250 UG/ML
250 INJECTION, SOLUTION INTRAMUSCULAR AS NEEDED
Status: DISCONTINUED | OUTPATIENT
Start: 2021-09-05 | End: 2021-09-05 | Stop reason: HOSPADM

## 2021-09-05 RX ORDER — OXYTOCIN-SODIUM CHLORIDE 0.9% IV SOLN 30 UNIT/500ML 30-0.9/5 UT/ML-%
SOLUTION INTRAVENOUS
Status: COMPLETED
Start: 2021-09-05 | End: 2021-09-05

## 2021-09-05 RX ORDER — SODIUM CHLORIDE, SODIUM LACTATE, POTASSIUM CHLORIDE, CALCIUM CHLORIDE 600; 310; 30; 20 MG/100ML; MG/100ML; MG/100ML; MG/100ML
125 INJECTION, SOLUTION INTRAVENOUS CONTINUOUS
Status: DISCONTINUED | OUTPATIENT
Start: 2021-09-05 | End: 2021-09-05

## 2021-09-05 RX ORDER — MAGNESIUM CARB/ALUMINUM HYDROX 105-160MG
30 TABLET,CHEWABLE ORAL ONCE
Status: DISCONTINUED | OUTPATIENT
Start: 2021-09-05 | End: 2021-09-05 | Stop reason: HOSPADM

## 2021-09-05 RX ORDER — LIDOCAINE HYDROCHLORIDE 10 MG/ML
5 INJECTION, SOLUTION EPIDURAL; INFILTRATION; INTRACAUDAL; PERINEURAL AS NEEDED
Status: DISCONTINUED | OUTPATIENT
Start: 2021-09-05 | End: 2021-09-05 | Stop reason: HOSPADM

## 2021-09-05 RX ORDER — IBUPROFEN 600 MG/1
600 TABLET ORAL EVERY 6 HOURS PRN
Status: DISCONTINUED | OUTPATIENT
Start: 2021-09-05 | End: 2021-09-07 | Stop reason: HOSPADM

## 2021-09-05 RX ORDER — OXYCODONE HYDROCHLORIDE AND ACETAMINOPHEN 5; 325 MG/1; MG/1
1 TABLET ORAL EVERY 4 HOURS PRN
Status: DISCONTINUED | OUTPATIENT
Start: 2021-09-05 | End: 2021-09-07 | Stop reason: HOSPADM

## 2021-09-05 RX ORDER — ONDANSETRON 2 MG/ML
4 INJECTION INTRAMUSCULAR; INTRAVENOUS EVERY 6 HOURS PRN
Status: DISCONTINUED | OUTPATIENT
Start: 2021-09-05 | End: 2021-09-06

## 2021-09-05 RX ORDER — DOCUSATE SODIUM 100 MG/1
100 CAPSULE, LIQUID FILLED ORAL 2 TIMES DAILY
Status: DISCONTINUED | OUTPATIENT
Start: 2021-09-05 | End: 2021-09-07 | Stop reason: HOSPADM

## 2021-09-05 RX ORDER — MISOPROSTOL 200 UG/1
800 TABLET ORAL AS NEEDED
Status: DISCONTINUED | OUTPATIENT
Start: 2021-09-05 | End: 2021-09-05 | Stop reason: HOSPADM

## 2021-09-05 RX ORDER — PROMETHAZINE HYDROCHLORIDE 12.5 MG/1
12.5 TABLET ORAL EVERY 4 HOURS PRN
Status: DISCONTINUED | OUTPATIENT
Start: 2021-09-05 | End: 2021-09-07 | Stop reason: HOSPADM

## 2021-09-05 RX ORDER — SODIUM CHLORIDE 0.9 % (FLUSH) 0.9 %
3 SYRINGE (ML) INJECTION EVERY 12 HOURS SCHEDULED
Status: DISCONTINUED | OUTPATIENT
Start: 2021-09-05 | End: 2021-09-05 | Stop reason: HOSPADM

## 2021-09-05 RX ORDER — ONDANSETRON 2 MG/ML
4 INJECTION INTRAMUSCULAR; INTRAVENOUS EVERY 6 HOURS PRN
Status: DISCONTINUED | OUTPATIENT
Start: 2021-09-05 | End: 2021-09-05 | Stop reason: HOSPADM

## 2021-09-05 RX ORDER — METHYLERGONOVINE MALEATE 0.2 MG/ML
INJECTION INTRAVENOUS
Status: COMPLETED
Start: 2021-09-05 | End: 2021-09-05

## 2021-09-05 RX ORDER — ACETAMINOPHEN 325 MG/1
650 TABLET ORAL EVERY 4 HOURS PRN
Status: DISCONTINUED | OUTPATIENT
Start: 2021-09-05 | End: 2021-09-05 | Stop reason: HOSPADM

## 2021-09-05 RX ORDER — MISOPROSTOL 200 UG/1
800 TABLET ORAL AS NEEDED
Status: DISCONTINUED | OUTPATIENT
Start: 2021-09-05 | End: 2021-09-07 | Stop reason: HOSPADM

## 2021-09-05 RX ORDER — SODIUM CHLORIDE 0.9 % (FLUSH) 0.9 %
1-10 SYRINGE (ML) INJECTION AS NEEDED
Status: DISCONTINUED | OUTPATIENT
Start: 2021-09-05 | End: 2021-09-06

## 2021-09-05 RX ORDER — LANOLIN
CREAM (ML) TOPICAL
Status: DISCONTINUED | OUTPATIENT
Start: 2021-09-05 | End: 2021-09-07 | Stop reason: HOSPADM

## 2021-09-05 RX ORDER — BUTORPHANOL TARTRATE 1 MG/ML
1 INJECTION, SOLUTION INTRAMUSCULAR; INTRAVENOUS
Status: DISCONTINUED | OUTPATIENT
Start: 2021-09-05 | End: 2021-09-05 | Stop reason: HOSPADM

## 2021-09-05 RX ADMIN — WITCH HAZEL 1 PAD: 500 SOLUTION RECTAL; TOPICAL at 19:06

## 2021-09-05 RX ADMIN — Medication: at 19:06

## 2021-09-05 RX ADMIN — SODIUM CHLORIDE 5 MILLION UNITS: 900 INJECTION INTRAVENOUS at 03:12

## 2021-09-05 RX ADMIN — OXYTOCIN-SODIUM CHLORIDE 0.9% IV SOLN 30 UNIT/500ML 2 MILLI-UNITS/MIN: 30-0.9/5 SOLUTION at 07:54

## 2021-09-05 RX ADMIN — PENICILLIN G 3 MILLION UNITS: 3000000 INJECTION, SOLUTION INTRAVENOUS at 12:08

## 2021-09-05 RX ADMIN — OXYTOCIN 2 MILLI-UNITS/MIN: 10 INJECTION INTRAVENOUS at 07:54

## 2021-09-05 RX ADMIN — SODIUM CHLORIDE, POTASSIUM CHLORIDE, SODIUM LACTATE AND CALCIUM CHLORIDE 125 ML/HR: 600; 310; 30; 20 INJECTION, SOLUTION INTRAVENOUS at 02:50

## 2021-09-05 RX ADMIN — ACETAMINOPHEN 650 MG: 325 TABLET, FILM COATED ORAL at 07:06

## 2021-09-05 RX ADMIN — OXYTOCIN-SODIUM CHLORIDE 0.9% IV SOLN 30 UNIT/500ML 650 ML/HR: 30-0.9/5 SOLUTION at 14:00

## 2021-09-05 RX ADMIN — PENICILLIN G 3 MILLION UNITS: 3000000 INJECTION, SOLUTION INTRAVENOUS at 08:19

## 2021-09-05 RX ADMIN — IBUPROFEN 600 MG: 600 TABLET, FILM COATED ORAL at 21:44

## 2021-09-05 RX ADMIN — METHYLERGONOVINE MALEATE 200 MCG: 0.2 INJECTION, SOLUTION INTRAMUSCULAR; INTRAVENOUS at 14:37

## 2021-09-05 RX ADMIN — DOCUSATE SODIUM 100 MG: 100 CAPSULE, LIQUID FILLED ORAL at 21:44

## 2021-09-05 NOTE — PROGRESS NOTES
CE now 3/70/-1. On pitocin augmentation and starting to have more painful contractions. Gbs+ and on PCN. Cat 1 tracing. She is hoping for natural labor.

## 2021-09-05 NOTE — H&P
"History and Physical:    Subjective     Chief Complaint   Patient presents with   • Leaking Fluid       Brittny Hill is a 26 y.o. year old  with an Estimated Date of Delivery: 21 currently at 37w5d presenting with leaking fluid.    Prenatal care has been with Brandon Holm MD.  It has been significant for GBS carrier.        Review of Systems  Pertinent items are noted in HPI.     Past Medical History:   Diagnosis Date   • Mild hyperemesis gravidarum 2021    Discussed OTC Unisom and Vit B6 Rx phenergan given.    • Pregnancy     other than first     Past Surgical History:   Procedure Laterality Date   • FOOT SURGERY      at age 13 on left foot     Family History   Problem Relation Age of Onset   • Hypertension Father      Social History     Tobacco Use   • Smoking status: Never Smoker   • Smokeless tobacco: Never Used   Vaping Use   • Vaping Use: Never used   Substance Use Topics   • Alcohol use: Not Currently   • Drug use: Never     Medications Prior to Admission   Medication Sig Dispense Refill Last Dose   • famotidine (Pepcid) 20 MG tablet Take 1 tablet by mouth 2 (Two) Times a Day. 30 tablet 11 2021 at Unknown time   • Prenat MV-Min-Methylfolate-FA (Prenate) 0.6-0.4 MG chewable tablet Chew 1 tablet Daily. 30 tablet 12 2021 at Unknown time   • Prenatal 28-0.8 MG tablet       • promethazine (PHENERGAN) 25 MG tablet Take 1 tablet by mouth Every 6 (Six) Hours As Needed for Nausea or Vomiting. 24 tablet 2      Allergies:  Latex  OB History    Para Term  AB Living   2 1 1 0 0 1   SAB TAB Ectopic Molar Multiple Live Births   0 0 0 0 0 1      # Outcome Date GA Lbr Rod/2nd Weight Sex Delivery Anes PTL Lv   2 Current            1 Term 18   3459 g (7 lb 10 oz) M Vaginal unsp  N DICKSON         Objective     /55   Pulse 78   Temp 98 °F (36.7 °C) (Oral)   Resp 16   Ht 167.6 cm (66\")   Wt 73.9 kg (163 lb)   LMP 2020   Breastfeeding Yes   BMI 26.31 kg/m² "     Physical Exam    General:  No acute distress           Abdomen: Gravid, nontender       FHT's: reactive    Cervix: 1/50   [place]: Contraction are irreg     Lab Review   External Prenatal Results     Pregnancy Outside Results - Transcribed From Office Records - See Scanned Records For Details     Test Value Date Time    ABO  O  09/05/21 0930    Rh  Positive  09/05/21 0930    Antibody Screen  Negative  09/05/21 0252       Negative  07/14/21 1105       Negative  02/19/21 1212    Varicella IgG       Rubella  2.67 index 02/19/21 1212    Hgb  10.7 g/dL 09/05/21 0252       10.2 g/dL 07/14/21 1105       12.2 g/dL 02/19/21 1212       12.6 g/dL 01/29/21 1214    Hct  32.4 % 09/05/21 0252       31.1 % 07/14/21 1105       36.7 % 02/19/21 1212       37.6 % 01/29/21 1214    Glucose Fasting GTT       Glucose Tolerance Test 1 hour       Glucose Tolerance Test 3 hour       Gonorrhea (discrete)       Chlamydia (discrete)       RPR  Non Reactive  02/19/21 1212    VDRL       Syphilis Antibody       HBsAg  Negative  02/19/21 1212    Herpes Simplex Virus PCR       Herpes Simplex VIrus Culture       HIV  Non Reactive  02/19/21 1212    Hep C RNA Quant PCR       Hep C Antibody  <0.1 s/co ratio 02/19/21 1212    AFP       Group B Strep  Streptococcus agalactiae (Group B)  08/30/21 2114    GBS Susceptibility to Clindamycin       GBS Susceptibility to Erythromycin       Fetal Fibronectin       Genetic Testing, Maternal Blood             Drug Screening     Test Value Date Time    Urine Drug Screen       Amphetamine Screen  Negative ng/mL 02/19/21 1212    Barbiturate Screen  Negative ng/mL 02/19/21 1212    Benzodiazepine Screen  Negative ng/mL 02/19/21 1212    Methadone Screen  Negative ng/mL 02/19/21 1212    Phencyclidine Screen  Negative ng/mL 02/19/21 1212    Opiates Screen       THC Screen       Cocaine Screen       Propoxyphene Screen  Negative ng/mL 02/19/21 1212    Buprenorphine Screen       Methamphetamine Screen       Oxycodone Screen        Tricyclic Antidepressants Screen             Legend    ^: Historical                            Assessment/Plan     ASSESSMENT  1. IUP at 37w5d  2. rupture of membranes   3. GBBSpositive  PLAN  1. Admit to labor and delivery   2.  pitocin and antibiotics for GBBS prophylaxis         Sandra Todd MD  9/5/2021@

## 2021-09-05 NOTE — PLAN OF CARE
Goal Outcome Evaluation:  Plan of Care Reviewed With: patient, spouse        Progress: improving  Outcome Summary: Attempted to assist wtih nursing.  Baby on and off breast for 5 minutes in CC and FM on left side.  Placed baby STS.  Breastfeeding education done, information and pump given.

## 2021-09-05 NOTE — LACTATION NOTE
09/05/21 1725   Maternal Information   Date of Referral 09/05/21   Person Making Referral other (see comments)  (courtesy)   Maternal Reason for Referral other (see comments);breastfeeding currently  ( first child 6 months)   Maternal Assessment   Breast Size Issue none   Breast Shape Bilateral:;round   Breast Density Bilateral:;soft   Nipples Bilateral:;everted   Left Nipple Symptoms intact   Right Nipple Symptoms intact   Maternal Infant Feeding   Maternal Emotional State receptive;anxious   Infant Positioning clutch/football;cross-cradle  (baby fussy, attempted left in both positions )   Signs of Milk Transfer audible swallow   Pain with Feeding no   Comfort Measures Before/During Feeding infant position adjusted;latch adjusted;maternal position adjusted   Nipple Shape After Feeding, Left Breast round;symmetrical;appropriately projected   Latch Assistance full assistance needed   Support Person Involvement verbally supports mother;actively supporting mother   Milk Expression/Equipment   Equipment for Home Use breast pump provided  (spectra given from [employer] stock)

## 2021-09-05 NOTE — PAYOR COMM NOTE
Notification of Delivery    Brittny Hill  1995  ID:[id number]    ACC/Utilization Review  Phone: [phone]  Fax: [phone]    [place] [address]    Patient:Brittny Hill                                          MR#:[id number]     Vaginal Delivery Note  26 y.o. yo female  at 37w5d         Patient Active Problem List   Diagnosis   • Pregnancy   • Uterine size date discrepancy pregnancy   • Threatened    • Screening for cervical cancer   • Heartburn   • GBS (group B Streptococcus carrier), +RV culture, currently pregnant   • Normal labor         Delivery      Delivery: Vaginal, Spontaneous     YOB: 2020    Time of Birth: 1:54 PM       Anesthesia: None     Delivering clinician: Gabriele Solorzano  and Sandra Todd   Forceps?   No   Vacuum? No    Shoulder dystocia present: No     Pt progressed rapidly without an epidural and had a precipitous delivery, Dr Irizarry was present, and then I arrived shortly afterwards. Placenta delivered intact, and no tears. She had a smal amount of bleeding afterwards that responded to uterine massage and a single dose of IM methergine.                 Infant     Findings: female  infant      Infant observations: Weight: 3220 g (7 lb 1.6 oz)      Observations/Comments:        Apgars: 9  @ 1 minute /     9  @ 5 minutes                  Placenta, Cord, and Fluid      Placenta delivered  Spontaneous  at  2021  2:00 PM      Cord: 3 vessels  present.   Nuchal Cord?  yes; Number of nuchal loops present:  2     Cord blood obtained: Yes     Cord gases obtained:             No     Cord gas results: Pending              Repair     Episiotomy: No   Lacerations: No   Estimated Blood Loss: 100  mls.            Complications  none     Disposition  Mother to Mother Baby/Postpartum  in stable condition currently.  Baby to remains with mom  in stable condition currently.      Brittny Hill  "(26 y.o. Female)     Date of Birth Social Security Number Address Home Phone MRN    1995  [address] [facility] [address] [phone] [id number]    Spiritism Marital Status          Catholic        Admission Date Admission Type Admitting Provider Attending Provider Department, Room/Bed    9/5/21 Elective Brandon Holm MD Adkins, John Thomas, MD [facility] MOTHER BABY 4B, N439/1    Discharge Date Discharge Disposition Discharge Destination                       Attending Provider: Brandon Holm MD    Allergies: Latex    Isolation: None   Infection: None   Code Status: CPR    Ht: 167.6 cm (66\")   Wt: 73.9 kg (163 lb)    Admission Cmt: None   Principal Problem: None                Active Insurance as of 9/5/2021     Primary Coverage     Payor Plan Insurance Group Employer/Plan Group    ANTHEM MEDICAID ANTHEM MEDICAID [id number]     Payor Plan Address Payor Plan Phone Number Payor Plan Fax Number Effective Dates    [address] [address] [phone]  3/1/2021 - None Entered    [facility] [address]       Subscriber Name Subscriber Birth Date Member ID       BRITTNY HILL 1995 [id number]                 Emergency Contacts      (Rel.) Home Phone Work Phone Mobile Phone    DANIELLA LEMONS (Spouse) -- -- [phone]    JESS LEMONS (Mother) -- -- [phone]            Insurance Information                ANTHEM MEDICAID/ANTHEM MEDICAID Phone: [phone]    Subscriber: Brittny Hill Subscriber#: [id number]    Group#: [id number] Precert#:           Problem List         Codes Noted - Resolved       Hospital    Normal labor ICD-10-CM: O80, Z37.9  ICD-9-CM: 650 9/5/2021 - Present       Non-Hospital    GBS (group B Streptococcus carrier), +RV culture, currently pregnant ICD-10-CM: O99.820  ICD-9-CM: V23.89, V02.51 9/1/2021 - Present    Heartburn ICD-10-CM: R12  ICD-9-CM: 787.1 5/10/2021 - Present    Screening for cervical cancer " ICD-10-CM: Z12.4  ICD-9-CM: V76.2 2021 - Present    Threatened  ICD-10-CM: O20.0  ICD-9-CM: 640.00 2021 - Present    Pregnancy ICD-10-CM: Z34.90  ICD-9-CM: V22.2 2021 - Present    Uterine size date discrepancy pregnancy ICD-10-CM: O26.849  ICD-9-CM: 649.63 2021 - Present             History & Physical      Sandra Todd MD at 21 1133          History and Physical:    Subjective     Chief Complaint   Patient presents with   • Leaking Fluid       Brittny Hill is a 26 y.o. year old  with an Estimated Date of Delivery: 21 currently at 37w5d presenting with leaking fluid.    Prenatal care has been with Brandon Holm MD.  It has been significant for GBS carrier.        Review of Systems  Pertinent items are noted in HPI.     Past Medical History:   Diagnosis Date   • Mild hyperemesis gravidarum 2021    Discussed OTC Unisom and Vit B6 Rx phenergan given.    • Pregnancy     other than first     Past Surgical History:   Procedure Laterality Date   • FOOT SURGERY      at age 13 on left foot     Family History   Problem Relation Age of Onset   • Hypertension Father      Social History     Tobacco Use   • Smoking status: Never Smoker   • Smokeless tobacco: Never Used   Vaping Use   • Vaping Use: Never used   Substance Use Topics   • Alcohol use: Not Currently   • Drug use: Never     Medications Prior to Admission   Medication Sig Dispense Refill Last Dose   • famotidine (Pepcid) 20 MG tablet Take 1 tablet by mouth 2 (Two) Times a Day. 30 tablet 11 2021 at Unknown time   • Prenat MV-Min-Methylfolate-FA (Prenate) 0.6-0.4 MG chewable tablet Chew 1 tablet Daily. 30 tablet 12 2021 at Unknown time   • Prenatal 28-0.8 MG tablet       • promethazine (PHENERGAN) 25 MG tablet Take 1 tablet by mouth Every 6 (Six) Hours As Needed for Nausea or Vomiting. 24 tablet 2      Allergies:  Latex  OB History    Para Term  AB Living   2 1 1 0 0 1   SAB TAB Ectopic  "Molar Multiple Live Births   0 0 0 0 0 1      # Outcome Date GA Lbr Rod/2nd Weight Sex Delivery Anes PTL Lv   2 Current            1 Term 07/18/18   3459 g (7 lb 10 oz) M Vaginal unsp  N DICKSON         Objective     /55   Pulse 78   Temp 98 °F (36.7 °C) (Oral)   Resp 16   Ht 167.6 cm (66\")   Wt 73.9 kg (163 lb)   LMP 11/20/2020   Breastfeeding Yes   BMI 26.31 kg/m²     Physical Exam    General:  No acute distress           Abdomen: Gravid, nontender       FHT's: reactive    Cervix: 1/50   [place]: Contraction are irreg     Lab Review   External Prenatal Results     Pregnancy Outside Results - Transcribed From Office Records - See Scanned Records For Details     Test Value Date Time    ABO  O  09/05/21 0930    Rh  Positive  09/05/21 0930    Antibody Screen  Negative  09/05/21 0252       Negative  07/14/21 1105       Negative  02/19/21 1212    Varicella IgG       Rubella  2.67 index 02/19/21 1212    Hgb  10.7 g/dL 09/05/21 0252       10.2 g/dL 07/14/21 1105       12.2 g/dL 02/19/21 1212       12.6 g/dL 01/29/21 1214    Hct  32.4 % 09/05/21 0252       31.1 % 07/14/21 1105       36.7 % 02/19/21 1212       37.6 % 01/29/21 1214    Glucose Fasting GTT       Glucose Tolerance Test 1 hour       Glucose Tolerance Test 3 hour       Gonorrhea (discrete)       Chlamydia (discrete)       RPR  Non Reactive  02/19/21 1212    VDRL       Syphilis Antibody       HBsAg  Negative  02/19/21 1212    Herpes Simplex Virus PCR       Herpes Simplex VIrus Culture       HIV  Non Reactive  02/19/21 1212    Hep C RNA Quant PCR       Hep C Antibody  <0.1 s/co ratio 02/19/21 1212    AFP       Group B Strep  Streptococcus agalactiae (Group B)  08/30/21 2114    GBS Susceptibility to Clindamycin       GBS Susceptibility to Erythromycin       Fetal Fibronectin       Genetic Testing, Maternal Blood             Drug Screening     Test Value Date Time    Urine Drug Screen       Amphetamine Screen  Negative ng/mL 02/19/21 1212    Barbiturate Screen "  Negative ng/mL 02/19/21 1212    Benzodiazepine Screen  Negative ng/mL 02/19/21 1212    Methadone Screen  Negative ng/mL 02/19/21 1212    Phencyclidine Screen  Negative ng/mL 02/19/21 1212    Opiates Screen       THC Screen       Cocaine Screen       Propoxyphene Screen  Negative ng/mL 02/19/21 1212    Buprenorphine Screen       Methamphetamine Screen       Oxycodone Screen       Tricyclic Antidepressants Screen             Legend    ^: Historical                            Assessment/Plan     ASSESSMENT  1. IUP at 37w5d  2. rupture of membranes   3. GBBSpositive  PLAN  1. Admit to labor and delivery   2.  pitocin and antibiotics for GBBS prophylaxis         Sandra Todd MD  9/5/2021@    Electronically signed by Sandra Todd MD at 09/05/21 1133       Vital Signs (last day)     Date/Time   Temp   Temp src   Pulse   Resp   BP   Patient Position   SpO2    09/05/21 1800   98.4 (36.9)   Oral   80   16   122/56   --   --    09/05/21 1630   98.5 (36.9)   Oral   74   16   124/70   --   --    09/05/21 1457   --   --   75   --   116/69   --   --    09/05/21 1445   --   --   85   --   116/72   --   --    09/05/21 1430   --   --   76   --   116/63   --   --    09/05/21 1415   97.8 (36.6)   Oral   78   16   107/59   --   --    09/05/21 1148   98.6 (37)   Oral   71   16   120/73   --   --    09/05/21 0951   98.7 (37.1)   Oral   78   16   105/55   --   --    09/05/21 0753   98 (36.7)   Oral   71   16   111/64   Lying   --    09/05/21 0534   97.9 (36.6)   Oral   79   16   121/60   Lying   --    09/05/21 0316   98.7 (37.1)   Axillary   89   16   104/66   Lying   --                Current Facility-Administered Medications   Medication Dose Route Frequency Provider Last Rate Last Admin   • benzocaine (AMERICAINE) 20 % rectal ointment 1 application  1 application Rectal PRN Sandra Todd MD       • benzocaine-menthol (DERMOPLAST) 20-0.5 % topical spray   Topical PRN Sandra Todd MD   Given at 09/05/21 1906   • [START ON 9/6/2021]  bisacodyl (DULCOLAX) suppository 10 mg  10 mg Rectal Daily PRN Sandra Todd MD       • docusate sodium (COLACE) capsule 100 mg  100 mg Oral BID Sandra Todd MD       • erythromycin (ROMYCIN) 5 MG/GM ophthalmic ointment  - ADS Override Pull            • HYDROcodone-acetaminophen (NORCO) 5-325 MG per tablet 1 tablet  1 tablet Oral Q4H PRSandra Ibrahim MD       • Hydrocortisone (Perianal) (ANUSOL-HC) 2.5 % rectal cream 1 application  1 application Rectal PRN Sandra Todd MD       • ibuprofen (ADVIL,MOTRIN) tablet 600 mg  600 mg Oral Q6H PRN Sandra Todd MD       • lanolin cream   Topical Q1H PRSandra Ibrahim MD       • lidocaine (XYLOCAINE) 1 % injection  - ADS Override Pull            • magnesium hydroxide (MILK OF MAGNESIA) suspension 2400 mg/10mL 10 mL  10 mL Oral Daily PRSandra Ibrahim MD       • miSOPROStol (CYTOTEC) tablet 800 mcg  800 mcg Vaginal PRSandra Ibrahim MD       • ondansetron (ZOFRAN) tablet 4 mg  4 mg Oral Q6H PRSandra Ibrahim MD        Or   • ondansetron (ZOFRAN) injection 4 mg  4 mg Intravenous Q6H PRSandra Ibrahim MD       • oxyCODONE-acetaminophen (PERCOCET) 5-325 MG per tablet 1 tablet  1 tablet Oral Q4H PRSandra Ibrahim MD       • promethazine (PHENERGAN) tablet 12.5 mg  12.5 mg Oral Q4H PRSandra Ibrahim MD       • sodium chloride 0.9 % flush 1-10 mL  1-10 mL Intravenous PRSandra Ibrahim MD       • witch hazel-glycerin (TUCKS) pad 1 pad  1 each Topical PRSandra Ibrahim MD   1 pad at 09/05/21 1906       Lab Results (last 24 hours)     Procedure Component Value Units Date/Time    COVID PRE-OP / PRE-PROCEDURE SCREENING ORDER (NO ISOLATION) - Swab, Nasopharynx [[id number]]  (Normal) Collected: 09/05/21 0252    Specimen: Swab from Nasopharynx Updated: 09/05/21 0346    Narrative:      The following orders were created for panel order COVID PRE-OP / PRE-PROCEDURE SCREENING ORDER (NO ISOLATION) - Swab, Nasopharynx.  Procedure                               Abnormality          Status                     ---------                               -----------         ------                     COVID-19, ABBOTT IN-HOUS...[[id number]]  Normal              Final result                 Please view results for these tests on the individual orders.    COVID-19, ABBOTT IN-HOUSE,NASAL Swab (NO TRANSPORT MEDIA) 2 HR TAT - Swab, Nasopharynx [[id number]]  (Normal) Collected: 09/05/21 0252    Specimen: Swab from Nasopharynx Updated: 09/05/21 0346     COVID19 Presumptive Negative    Narrative:      Fact sheet for providers: https://www.fda.gov/media/084856/download     Fact sheet for patients: https://www.fda.gov/media/635895/download    Test performed by PCR.  If inconsistent with clinical signs and symptoms patient should be tested with different authorized molecular test.    Preeclampsia Panel [[id number]]  (Abnormal) Collected: 09/05/21 0252    Specimen: Blood Updated: 09/05/21 0338     Alkaline Phosphatase 98 U/L      ALT (SGPT) 7 U/L      AST (SGOT) 15 U/L      Creatinine 0.54 mg/dL      Total Bilirubin <0.2 mg/dL       U/L      Uric Acid 4.2 mg/dL     CBC (No Diff) [[id number]]  (Abnormal) Collected: 09/05/21 0252    Specimen: Blood Updated: 09/05/21 0324     WBC 9.34 10*3/mm3      RBC 4.01 10*6/mm3      Hemoglobin 10.7 g/dL      Hematocrit 32.4 %      MCV 80.8 fL      MCH 26.7 pg      MCHC 33.0 g/dL      RDW 14.1 %      RDW-SD 41.4 fl      MPV 10.2 fL      Platelets 227 10*3/mm3         Orders (last 24 hrs)      Start     Ordered    09/06/21 0800  Sitz Bath  3 Times Daily     Comments: PRN    09/05/21 1633    09/06/21 0600  CBC & Differential  Timed     Comments: Postpartum Day 1      09/05/21 1633    09/06/21 0000  bisacodyl (DULCOLAX) suppository 10 mg  Daily PRN      09/05/21 1633    09/05/21 2100  docusate sodium (COLACE) capsule 100 mg  2 Times Daily      09/05/21 1633    09/05/21 1634  Code Status and Medical Interventions:  Continuous      09/05/21 1633    09/05/21 1634  Vital Signs Per  hospital policy  Per Hospital Policy      09/05/21 1633    09/05/21 1634  Notify Physician  Until Discontinued      09/05/21 1633    09/05/21 1634  Up Ad Marisel  Until Discontinued      09/05/21 1633    09/05/21 1634  Ambulate Patient  Every Shift      09/05/21 1633    09/05/21 1634  Diet Regular  Diet Effective Now      09/05/21 1633    09/05/21 1634  Advance Diet as Tolerated  Until Discontinued      09/05/21 1633    09/05/21 1634  Fundal and Lochia Check  Per Hospital Policy     Comments: Q 15 min x 4, Q 30 min x 2, then Q Shift    09/05/21 1633    09/05/21 1634  RN to Assess Rh Status & Place RhIG Evaluation Order if Indicated  Continuous      09/05/21 1633    09/05/21 1634  Bladder Assessment  Per Order Details     Comments: Postpartum 1) Upon Admission to Unit & Every 4 Hours PRN Until Voiding. 2) Out of Bed to Void in 8 Hours.    09/05/21 1633    09/05/21 1634  Straight Cath  Per Order Details     Comments: Postpartum: If Distended & Unable to Void, May Repeat Once.    09/05/21 1633    09/05/21 1634  Indwelling Urinary Catheter  Per Order Details     Comments: Postpartum : After Straight Cathed x2 or if Greater Than 1000mL Residual, Insert Indwelling Urinary Catheter Until Further MD Order.    09/05/21 1633    09/05/21 1634  Waffle Cushion  Once     Comments: For perineal discomfort    09/05/21 1633    09/05/21 1634  Breast pump to bed  Once      09/05/21 1633    09/05/21 1634  If indicated -- Please administer RH Immunoglobulin based on results of cord blood evaluation and fetal screen lab tests, pharmacy to dispense  Continuous     Comments: See process instructions for reference range details.    09/05/21 1633    09/05/21 1633  witch hazel-glycerin (TUCKS) pad 1 pad  As Needed      09/05/21 1633    09/05/21 1633  ondansetron (ZOFRAN) tablet 4 mg  Every 6 Hours PRN      09/05/21 1633    09/05/21 1633  ondansetron (ZOFRAN) injection 4 mg  Every 6 Hours PRN      09/05/21 1633    09/05/21 1633  sodium chloride  0.9 % flush 1-10 mL  As Needed      09/05/21 1633    09/05/21 1633  ibuprofen (ADVIL,MOTRIN) tablet 600 mg  Every 6 Hours PRN      09/05/21 1633    09/05/21 1633  HYDROcodone-acetaminophen (NORCO) 5-325 MG per tablet 1 tablet  Every 4 Hours PRN      09/05/21 1633    09/05/21 1633  oxyCODONE-acetaminophen (PERCOCET) 5-325 MG per tablet 1 tablet  Every 4 Hours PRN      09/05/21 1633    09/05/21 1633  miSOPROStol (CYTOTEC) tablet 800 mcg  As Needed      09/05/21 1633    09/05/21 1633  magnesium hydroxide (MILK OF MAGNESIA) suspension 2400 mg/10mL 10 mL  Daily PRN      09/05/21 1633    09/05/21 1633  lanolin cream  Every 1 Hour PRN      09/05/21 1633    09/05/21 1633  benzocaine-menthol (DERMOPLAST) 20-0.5 % topical spray  As Needed      09/05/21 1633    09/05/21 1633  Hydrocortisone (Perianal) (ANUSOL-HC) 2.5 % rectal cream 1 application  As Needed      09/05/21 1633    09/05/21 1633  benzocaine (AMERICAINE) 20 % rectal ointment 1 application  As Needed      09/05/21 1633    09/05/21 1633  promethazine (PHENERGAN) tablet 12.5 mg  Every 4 Hours PRN      09/05/21 1633    09/05/21 1530  oxytocin in sodium chloride (PITOCIN) 30 UNIT/500ML infusion solution  Once,   Status:  Discontinued      09/05/21 1437    09/05/21 1438  Notify Provider (Specified)  Until Discontinued      09/05/21 1437    09/05/21 1438  Vital Signs Per Hospital Policy  Per Hospital Policy      09/05/21 1437    09/05/21 1438  Fundal & Lochia Check  Per Order Details     Comments: Every 15 Minutes x4, Then Every 30 Minutes x2, Then Every Shift    09/05/21 1437    09/05/21 1438  Fundal & Lochia Check  Every Shift      09/05/21 1437    09/05/21 1438  Diet Regular  Diet Effective Now,   Status:  Canceled      09/05/21 1437 09/05/21 1437  oxytocin in sodium chloride (PITOCIN) 30 UNIT/500ML infusion solution  Once      09/05/21 1437 09/05/21 1437  methylergonovine (METHERGINE) injection 200 mcg  Once As Needed,   Status:  Discontinued      09/05/21 1437     09/05/21 1437  carboprost (HEMABATE) injection 250 mcg  As Needed,   Status:  Discontinued      09/05/21 1437    09/05/21 1437  miSOPROStol (CYTOTEC) tablet 800 mcg  As Needed,   Status:  Discontinued      09/05/21 1437 09/05/21 1434  Transfer Patient  Once      09/05/21 1434    09/05/21 1434  VTE Prophylaxis Not Indicated: No Risk Factors (0); </= 3 (Low Risk)  Once      09/05/21 1434    09/05/21 1431  methylergonovine (METHERGINE) 0.2 MG/ML injection  - ADS Override Pull     Note to Pharmacy: Created by cabinet override    09/05/21 1431 09/05/21 0900  sodium chloride 0.9 % flush 3 mL  Every 12 Hours Scheduled,   Status:  Discontinued      09/05/21 0251 09/05/21 0800  penicillin G in iso-osmotic dextrose IVPB 3 million units (premix)  Every 4 Hours,   Status:  Discontinued      09/05/21 0251 09/05/21 0745  oxytocin in sodium chloride (PITOCIN) 30 UNIT/500ML infusion solution  Titrated,   Status:  Discontinued      09/05/21 0652    09/05/21 0649  lidocaine (XYLOCAINE) 1 % injection  - ADS Override Pull     Note to Pharmacy: Created by cabinet override    09/05/21 0649    09/05/21 0649  erythromycin (ROMYCIN) 5 MG/GM ophthalmic ointment  - ADS Override Pull     Note to Pharmacy: Created by cabinet override    09/05/21 0649    09/05/21 0600  ABO RH Specimen Verification  STAT      09/05/21 0346    09/05/21 0345  lactated ringers infusion  Continuous,   Status:  Discontinued      09/05/21 0251 09/05/21 0345  mineral oil liquid 30 mL  Once,   Status:  Discontinued      09/05/21 0251    09/05/21 0345  penicillin g 5 mu/100 mL 0.9% NS IVPB (mbp)  Once      09/05/21 0251 09/05/21 0251  ondansetron (ZOFRAN) tablet 4 mg  Every 6 Hours PRN,   Status:  Discontinued      09/05/21 0251 09/05/21 0251  ondansetron (ZOFRAN) injection 4 mg  Every 6 Hours PRN,   Status:  Discontinued      09/05/21 0251    09/05/21 0251  butorphanol (STADOL) injection 2 mg  Every 2 Hours PRN,   Status:  Discontinued       21  butorphanol (STADOL) injection 1 mg  Every 2 Hours PRN,   Status:  Discontinued      21  acetaminophen (TYLENOL) tablet 650 mg  Every 4 Hours PRN,   Status:  Discontinued      21  VTE Prophylaxis Not Indicated: No Risk Factors (0); </= 3 (Low Risk)  Once      21  Diet Clear Liquid  Diet Effective Now,   Status:  Canceled      21  Admit To Obstetrics Inpatient  Once      21  Obtain Informed Consent  Once      21  Vital Signs Per Hospital Policy  Per Hospital Policy,   Status:  Canceled      21  Mini-Prep Prior to Delivery  Once,   Status:  Canceled      21  Continuous Fetal Monitoring With NST on Admission and Prior to Initiation of Oxytocin.  Per Order Details,   Status:  Canceled     Comments: Continuous Fetal Monitoring With NST on Admission & Prior to Initiation of Oxytocin.    21  External Uterine Contraction Monitoring  Per Hospital Policy,   Status:  Canceled      211    21  Notify Provider (Specified)  Until Discontinued,   Status:  Canceled      21  Notify Provider of Tachysystole (Per Hospital Algorithm)  Until Discontinued,   Status:  Canceled      21 025  Notify Provider if Membranes Ruptured, Bleeding Greater Than 1 Pad Per Hour, Fetal Heart Tone Abnormality or Severe Pain  Until Discontinued,   Status:  Canceled      21  Initiate Group Beta Strep (GBS) Prophylaxis Protocol, If Criteria Met  Continuous,   Status:  Canceled     Comments: NO TREATMENT RECOMMENDED IF: 1) Maternal GBS Status Known Negative 2) Scheduled  Birth With Intact Membranes, Not in Labor 3) Maternal GBS Status Unknown, No Risk Factors  TREAT WITH  ANTIBIOTICS IF:  1) Maternal GBS Status Known Positive 2) Maternal GBS Status Unknown With Risk Factors: a)  Previous Infant Affected By GBS Infection b) GBS Urinary Tract Infection (UTI) or Bacteriuria During Pregnancy c) Unexplained Maternal Fever (100.4F (38C) or Greater) During Labor d)  Prolonged Rupture of Membranes (18 or More Hours) e) Gestational Age Less Than 37 Weeks    09/05/21 0251 09/05/21 0250  Insert Peripheral IV  Once      09/05/21 0251 09/05/21 0250  Saline Lock & Maintain IV Access  Continuous,   Status:  Canceled      09/05/21 0251    09/05/21 0249  Position Change - For Intra-Uterine Resusitation for Hypertonus, HyperStimulation or Non-Reassuring Fetal Status  As Needed,   Status:  Canceled      09/05/21 0251 09/05/21 0249  lidocaine PF 1% (XYLOCAINE) injection 5 mL  As Needed,   Status:  Discontinued      09/05/21 0251 09/05/21 0249  sodium chloride 0.9 % flush 10 mL  As Needed,   Status:  Discontinued      09/05/21 0251 09/05/21 0249  lactated ringers bolus 1,000 mL  Once As Needed,   Status:  Discontinued      09/05/21 0251    09/05/21 0249  COVID PRE-OP / PRE-PROCEDURE SCREENING ORDER (NO ISOLATION) - Swab, Nasopharynx  Once      09/05/21 0248    09/05/21 0249  COVID-19, ABBOTT IN-HOUSE,NASAL Swab (NO TRANSPORT MEDIA) 2 HR TAT - Swab, Nasopharynx  PROCEDURE ONCE      09/05/21 0248    09/05/21 0249  CBC (No Diff)  STAT      09/05/21 0249    09/05/21 0249  Type & Screen  STAT      09/05/21 0249    09/05/21 0249  Preeclampsia Panel  STAT      09/05/21 0249    Unscheduled  Apply Ice to Perineum  As Needed     Comments: For 20 min q 2 hrs    09/05/21 1633    Unscheduled  Kpad  As Needed     Comments: For pain    09/05/21 1633    Unscheduled  Warm compress  As Needed      09/05/21 1633    Unscheduled  Apply ace wrap, tight bra, or binder  As Needed      09/05/21 1633    Unscheduled  Apply ice packs  As Needed      09/05/21 1633                   Physician Progress Notes (last 24  hours) (Notes from 09/04/21 1956 through 09/05/21 1956)      Sandra Todd MD at 09/05/21 4832        CE now 3/70/-1. On pitocin augmentation and starting to have more painful contractions. Gbs+ and on PCN. Cat 1 tracing. She is hoping for natural labor.     Electronically signed by Sandra Todd MD at 09/05/21 8510

## 2021-09-05 NOTE — L&D DELIVERY NOTE
2021    Patient:Brittny Hill    MR#:[id number]    Vaginal Delivery Note  26 y.o. yo female  at 37w5d    Patient Active Problem List   Diagnosis   • Pregnancy   • Uterine size date discrepancy pregnancy   • Threatened    • Screening for cervical cancer   • Heartburn   • GBS (group B Streptococcus carrier), +RV culture, currently pregnant   • Normal labor       Delivery     Delivery: Vaginal, Spontaneous     YOB: 2020    Time of Birth: 1:54 PM      Anesthesia: None     Delivering clinician: Gabriele Solorzano  and Sandra Todd   Forceps?   No   Vacuum? No    Shoulder dystocia present: No     Pt progressed rapidly without an epidural and had a precipitous delivery, Dr Irizarry was present, and then I arrived shortly afterwards. Placenta delivered intact, and no tears. She had a smal amount of bleeding afterwards that responded to uterine massage and a single dose of IM methergine.      Infant    Findings: female  infant     Infant observations: Weight: 3220 g (7 lb 1.6 oz)     Observations/Comments:        Apgars: 9  @ 1 minute /    9  @ 5 minutes         Placenta, Cord, and Fluid    Placenta delivered  Spontaneous  at  2021  2:00 PM     Cord: 3 vessels  present.   Nuchal Cord?  yes; Number of nuchal loops present:  2    Cord blood obtained: Yes    Cord gases obtained:  No    Cord gas results: Pending         Repair    Episiotomy: No   Lacerations: No   Estimated Blood Loss: 100  mls.         Complications  none    Disposition  Mother to Mother Baby/Postpartum  in stable condition currently.  Baby to remains with mom  in stable condition currently.                Sandra Todd MD  21  14:44 EDT

## 2021-09-06 LAB
BASOPHILS # BLD AUTO: 0.02 10*3/MM3 (ref 0–0.2)
BASOPHILS NFR BLD AUTO: 0.2 % (ref 0–1.5)
DEPRECATED RDW RBC AUTO: 42 FL (ref 37–54)
EOSINOPHIL # BLD AUTO: 0.08 10*3/MM3 (ref 0–0.4)
EOSINOPHIL NFR BLD AUTO: 0.8 % (ref 0.3–6.2)
ERYTHROCYTE [DISTWIDTH] IN BLOOD BY AUTOMATED COUNT: 14.1 % (ref 12.3–15.4)
HCT VFR BLD AUTO: 31.4 % (ref 34–46.6)
HGB BLD-MCNC: 9.9 G/DL (ref 12–15.9)
IMM GRANULOCYTES # BLD AUTO: 0.07 10*3/MM3 (ref 0–0.05)
IMM GRANULOCYTES NFR BLD AUTO: 0.7 % (ref 0–0.5)
LYMPHOCYTES # BLD AUTO: 1.83 10*3/MM3 (ref 0.7–3.1)
LYMPHOCYTES NFR BLD AUTO: 18.7 % (ref 19.6–45.3)
MCH RBC QN AUTO: 26.2 PG (ref 26.6–33)
MCHC RBC AUTO-ENTMCNC: 31.5 G/DL (ref 31.5–35.7)
MCV RBC AUTO: 83.1 FL (ref 79–97)
MONOCYTES # BLD AUTO: 1 10*3/MM3 (ref 0.1–0.9)
MONOCYTES NFR BLD AUTO: 10.2 % (ref 5–12)
NEUTROPHILS NFR BLD AUTO: 6.79 10*3/MM3 (ref 1.7–7)
NEUTROPHILS NFR BLD AUTO: 69.4 % (ref 42.7–76)
NRBC BLD AUTO-RTO: 0 /100 WBC (ref 0–0.2)
PLATELET # BLD AUTO: 200 10*3/MM3 (ref 140–450)
PMV BLD AUTO: 10.4 FL (ref 6–12)
RBC # BLD AUTO: 3.78 10*6/MM3 (ref 3.77–5.28)
WBC # BLD AUTO: 9.79 10*3/MM3 (ref 3.4–10.8)

## 2021-09-06 PROCEDURE — 99231 SBSQ HOSP IP/OBS SF/LOW 25: CPT | Performed by: OBSTETRICS & GYNECOLOGY

## 2021-09-06 PROCEDURE — 85025 COMPLETE CBC W/AUTO DIFF WBC: CPT | Performed by: OBSTETRICS & GYNECOLOGY

## 2021-09-06 RX ADMIN — DOCUSATE SODIUM 100 MG: 100 CAPSULE, LIQUID FILLED ORAL at 08:00

## 2021-09-06 RX ADMIN — DOCUSATE SODIUM 100 MG: 100 CAPSULE, LIQUID FILLED ORAL at 21:14

## 2021-09-06 RX ADMIN — IBUPROFEN 600 MG: 600 TABLET, FILM COATED ORAL at 21:14

## 2021-09-06 NOTE — LACTATION NOTE
Baby appears to be nipple confused (Mom had given a pacifier), and is too agitated to maintain a good latch.  A nipple shield was tried.  Mom was encouraged to hold baby upright for a while to calm her and see if she will burp before trying the latch again.  She was advised to not use the pacifier, if possible.  Mom's milk hand expresses very well.

## 2021-09-06 NOTE — LACTATION NOTE
Mom reports baby is breastfeeding a little better today, but tends to sometimes have a shallow latch.  She was encouraged to call for latch assistance, if needed.

## 2021-09-06 NOTE — PROGRESS NOTES
Postpartum Progress Note    Patient name: Brittny Hill  YOB: 1995   MRN: [id number]  Referring Provider: Herminio Galaviz MD  Admission Date: 2021  Date of Service: 2021    ID: 26 y.o.     Diagnosis:   S/p vaginal delivery   Patient Active Problem List   Diagnosis   • Pregnancy   • Uterine size date discrepancy pregnancy   • Threatened    • Screening for cervical cancer   • Heartburn   • GBS (group B Streptococcus carrier), +RV culture, currently pregnant   • Normal labor       Subjective:      No complaints.  Moderate lochia.  Ambulating, voiding, tolerating diet.  Pain well controlled.  The patient is currently breastfeeding.   This baby is a girl.    Objective:      Vital signs:  Vital Signs Range for the last 24 hours  Temperature: Temp:  [97.8 °F (36.6 °C)-98.6 °F (37 °C)] 98 °F (36.7 °C)   Temp Source: Temp src: Oral   BP: BP: (107-126)/(56-74) 119/74   Pulse: Heart Rate:  [71-92] 83   Respirations: Resp:  [16] 16   Weight: 73.9 kg (163 lb)     General: Alert & oriented x4, in no apparent distress  Abdomen: soft, nontender  Uterus: firm, nontender  Extremities: nontender; no edema      Labs:  Lab Results   Component Value Date    WBC 9.79 2021    HGB 9.9 (L) 2021    HCT 31.4 (L) 2021    MCV 83.1 2021     2021     Results from last 7 days   Lab Units 21  0930   ABO TYPING  O   RH TYPING  Positive     External Prenatal Results     Pregnancy Outside Results - Transcribed From Office Records - See Scanned Records For Details     Test Value Date Time    ABO  O  21 0930    Rh  Positive  21 0930    Antibody Screen  Negative  21 0252       Negative  21 1105       Negative  21 1212    Varicella IgG       Rubella  2.67 index 21 1212    Hgb  9.9 g/dL 21 0552       10.7 g/dL 21 0252       10.2 g/dL 21 1105       12.2 g/dL 21 1212       12.6 g/dL 21 1214    Hct  31.4 %  09/06/21 0552       32.4 % 09/05/21 0252       31.1 % 07/14/21 1105       36.7 % 02/19/21 1212       37.6 % 01/29/21 1214    Glucose Fasting GTT       Glucose Tolerance Test 1 hour       Glucose Tolerance Test 3 hour       Gonorrhea (discrete)       Chlamydia (discrete)       RPR  Non Reactive  02/19/21 1212    VDRL       Syphilis Antibody       HBsAg  Negative  02/19/21 1212    Herpes Simplex Virus PCR       Herpes Simplex VIrus Culture       HIV  Non Reactive  02/19/21 1212    Hep C RNA Quant PCR       Hep C Antibody  <0.1 s/co ratio 02/19/21 1212    AFP       Group B Strep  Streptococcus agalactiae (Group B)  08/30/21 2114    GBS Susceptibility to Clindamycin       GBS Susceptibility to Erythromycin       Fetal Fibronectin       Genetic Testing, Maternal Blood             Drug Screening     Test Value Date Time    Urine Drug Screen       Amphetamine Screen  Negative ng/mL 02/19/21 1212    Barbiturate Screen  Negative ng/mL 02/19/21 1212    Benzodiazepine Screen  Negative ng/mL 02/19/21 1212    Methadone Screen  Negative ng/mL 02/19/21 1212    Phencyclidine Screen  Negative ng/mL 02/19/21 1212    Opiates Screen       THC Screen       Cocaine Screen       Propoxyphene Screen  Negative ng/mL 02/19/21 1212    Buprenorphine Screen       Methamphetamine Screen       Oxycodone Screen       Tricyclic Antidepressants Screen             Legend    ^: Historical                        Assessment/Plan:      PPD#1 s/p vaginal delivery.  1. S/p vaginal delivery: Doing well.Continue routine postpartum care.    2. Infant feeding: Supportive care.  The patient is currently breastfeeding.  3. Contraception: Education given regarding options for contraception, including barrier methods, injectable contraception, IUD placement, oral contraceptives.    Ascencion Patel MD  09/06/21

## 2021-09-07 VITALS
SYSTOLIC BLOOD PRESSURE: 112 MMHG | DIASTOLIC BLOOD PRESSURE: 77 MMHG | WEIGHT: 163 LBS | HEIGHT: 66 IN | HEART RATE: 73 BPM | RESPIRATION RATE: 16 BRPM | TEMPERATURE: 97.8 F | BODY MASS INDEX: 26.2 KG/M2

## 2021-09-07 PROCEDURE — 99231 SBSQ HOSP IP/OBS SF/LOW 25: CPT | Performed by: OBSTETRICS & GYNECOLOGY

## 2021-09-07 RX ORDER — IBUPROFEN 600 MG/1
600 TABLET ORAL EVERY 6 HOURS PRN
Qty: 30 TABLET | Refills: 0 | Status: SHIPPED | OUTPATIENT
Start: 2021-09-07 | End: 2023-02-08

## 2021-09-07 RX ADMIN — DOCUSATE SODIUM 100 MG: 100 CAPSULE, LIQUID FILLED ORAL at 08:22

## 2021-09-07 NOTE — LACTATION NOTE
09/07/21 1300   Maternal Information   Date of Referral 09/07/21   Person Making Referral other (see comments)  (courtes)   Maternal Reason for Referral breastfeeding currently   Maternal Assessment   Breast Size Issue none   Breast Shape Bilateral:;round   Breast Density Bilateral:;filling   Nipples Bilateral:;everted   Left Nipple Symptoms tender;redness   Right Nipple Symptoms tender;redness   Maternal Infant Feeding   Infant Positioning clutch/football   Signs of Milk Transfer audible swallow;deep jaw excursions noted   Pain with Feeding no   Latch Assistance minimal assistance

## 2021-09-07 NOTE — DISCHARGE SUMMARY
Discharge Summary    Date of Admission: 2021  Date of Discharge:  2021      Patient: Brittny Hill      MR#:[id number]    Delivery Provider: Gabriele Solorzano     Discharge Surgeon/OB: Dr Holm    Presenting Problem/History of Present Illness  Normal labor [O80, Z37.9]     Patient Active Problem List   Diagnosis    Pregnancy    Uterine size date discrepancy pregnancy    Threatened     Screening for cervical cancer    Heartburn    GBS (group B Streptococcus carrier), +RV culture, currently pregnant    Normal labor         Discharge Diagnosis: Vaginal delivery at 37w5d    Procedures:  Vaginal, Spontaneous     2021    1:54 PM        Discharge Date: 2021;     Hospital Course  Patient is a 26 y.o. female  at 37w5d status post vaginal delivery without complication. Postpartum the patient did well. She remained afebrile, with vital signs stable. She was ready for discharge on postpartum day 2. Exam normal on discharge, NAD, lochia normal, abd soft and non-tender, no s/s PP depression. Breastfeeding.     Infant:   female  fetus 3220 g (7 lb 1.6 oz)  with Apgar scores of 9 , 9  at five minutes.    Condition on Discharge:  Stable    Vital Signs  Temp:  [97.8 °F (36.6 °C)-98.5 °F (36.9 °C)] 97.8 °F (36.6 °C)  Heart Rate:  [73-89] 73  Resp:  [16] 16  BP: (108-119)/(66-77) 112/77    Lab Results   Component Value Date    WBC 9.79 2021    HGB 9.9 (L) 2021    HCT 31.4 (L) 2021    MCV 83.1 2021     2021       Discharge Disposition  Home or Self Care    Discharge Medications     Discharge Medications        New Medications        Instructions Start Date   ibuprofen 600 MG tablet  Commonly known as: ADVIL,MOTRIN   600 mg, Oral, Every 6 Hours PRN             Continue These Medications        Instructions Start Date   Prenatal 28-0.8 MG tablet   No dose, route, or frequency recorded.             Stop These Medications      famotidine 20 MG tablet  Commonly known as:  Pepcid     Prenate 0.6-0.4 MG chewable tablet     promethazine 25 MG tablet  Commonly known as: PHENERGAN              Discharge Diet:      Activity at Discharge:   Activity Instructions       Activity as Tolerated      Pelvic Rest      Pelvic rest including no tampons, tub baths, or intercourse until 6 weeks postpartum/cleared by Provider.            Follow-up Appointments  Future Appointments   Date Time Provider Department Center   9/8/2021  9:15 AM Brandon Holm MD [facility]   10/20/2021 10:45 AM Brandon Holm MD [facility]     Additional Instructions for the Follow-ups that You Need to Schedule       Call MD for problems / concerns.   As directed      Discharge Follow-up with Specified Provider: Dr Holm; 6 Weeks   As directed      To: Dr Holm    Follow Up: 6 Weeks                 Brandon Holm MD  09/07/21  12:16 EDT  Csd

## 2021-09-08 NOTE — PAYOR COMM NOTE
"Brittny Hill (26 y.o. Female)   Auth : [id number]    Pt and baby DC home on 9/7.   Faxed By: Saranya Schroeder [phone] p, [phone] f    Date of Birth Social Security Number Address Home Phone MRN    1995  [address] [facility] [address] [phone] [id number]    Confucianism Marital Status          Muslim        Admission Date Admission Type Admitting Provider Attending Provider Department, Room/Bed    9/5/21 Elective Brandon Holm MD  Psychiatric MOTHER BABY 4B, N439/1    Discharge Date Discharge Disposition Discharge Destination        9/7/2021 Home or Self Care              Attending Provider: (none)   Allergies: Latex    Isolation: None   Infection: None   Code Status: Prior    Ht: 167.6 cm (66\")   Wt: 73.9 kg (163 lb)    Admission Cmt: None   Principal Problem: None                Active Insurance as of 9/5/2021     Primary Coverage     Payor Plan Insurance Group Employer/Plan Group    ANTH MEDICAID [facility] MEDICAID [id number]     Payor Plan Address Payor Plan Phone Number Payor Plan Fax Number Effective Dates    [address] [address] [phone]  3/1/2021 - None Entered    [facility] [id number]       Subscriber Name Subscriber Birth Date Member ID       BRITTNY HILL 1995 [id number]                 Emergency Contacts      (Rel.) Home Phone Work Phone Mobile Phone    ELISSADANIELLA ESTEVES (Spouse) -- -- [phone]    VESTA JESS (Mother) -- -- [phone]               Discharge Summary      Brandon Holm MD at 09/07/21 0832          Discharge Summary    Date of Admission: 9/5/2021  Date of Discharge:  9/7/2021      Patient: Brittny Hill      MR#:[id number]    Delivery Provider: Gabriele Solorzano     Discharge Surgeon/OB: Dr Holm    Presenting Problem/History of Present Illness  Normal labor [O80, Z37.9]     Patient Active Problem List   Diagnosis   • Pregnancy   • Uterine size date discrepancy pregnancy   • " Threatened    • Screening for cervical cancer   • Heartburn   • GBS (group B Streptococcus carrier), +RV culture, currently pregnant   • Normal labor         Discharge Diagnosis: Vaginal delivery at 37w5d    Procedures:  Vaginal, Spontaneous     2021    1:54 PM        Discharge Date: 2021;     Hospital Course  Patient is a 26 y.o. female  at 37w5d status post vaginal delivery without complication. Postpartum the patient did well. She remained afebrile, with vital signs stable. She was ready for discharge on postpartum day 2. Exam normal on discharge, NAD, lochia normal, abd soft and non-tender, no s/s PP depression. Breastfeeding.     Infant:   female  fetus 3220 g (7 lb 1.6 oz)  with Apgar scores of 9 , 9  at five minutes.    Condition on Discharge:  Stable    Vital Signs  Temp:  [97.8 °F (36.6 °C)-98.5 °F (36.9 °C)] 97.8 °F (36.6 °C)  Heart Rate:  [73-89] 73  Resp:  [16] 16  BP: (108-119)/(66-77) 112/77    Lab Results   Component Value Date    WBC 9.79 2021    HGB 9.9 (L) 2021    HCT 31.4 (L) 2021    MCV 83.1 2021     2021       Discharge Disposition  Home or Self Care    Discharge Medications     Discharge Medications        New Medications        Instructions Start Date   ibuprofen 600 MG tablet  Commonly known as: ADVIL,MOTRIN   600 mg, Oral, Every 6 Hours PRN             Continue These Medications        Instructions Start Date   Prenatal 28-0.8 MG tablet   No dose, route, or frequency recorded.             Stop These Medications      famotidine 20 MG tablet  Commonly known as: Pepcid     Prenate 0.6-0.4 MG chewable tablet     promethazine 25 MG tablet  Commonly known as: PHENERGAN              Discharge Diet:      Activity at Discharge:   Activity Instructions       Activity as Tolerated      Pelvic Rest      Pelvic rest including no tampons, tub baths, or intercourse until 6 weeks postpartum/cleared by Provider.            Follow-up  Appointments  Future Appointments   Date Time Provider Department Center   9/8/2021  9:15 AM Brandon Holm MD [facility]   10/20/2021 10:45 AM Brandon Holm MD [facility]     Additional Instructions for the Follow-ups that You Need to Schedule       Call MD for problems / concerns.   As directed      Discharge Follow-up with Specified Provider: Dr Holm; 6 Weeks   As directed      To: Dr Holm    Follow Up: 6 Weeks                 Brandon Holm MD  09/07/21  12:16 EDT  Csd            Electronically signed by Brandon Holm MD at 09/07/21 1216

## 2021-10-25 ENCOUNTER — POSTPARTUM VISIT (OUTPATIENT)
Dept: OBSTETRICS AND GYNECOLOGY | Facility: CLINIC | Age: 26
End: 2021-10-25

## 2021-10-25 VITALS
DIASTOLIC BLOOD PRESSURE: 62 MMHG | BODY MASS INDEX: 23.63 KG/M2 | SYSTOLIC BLOOD PRESSURE: 110 MMHG | WEIGHT: 147 LBS | HEIGHT: 66 IN

## 2021-10-25 DIAGNOSIS — N85.4 RETROVERTED UTERUS: ICD-10-CM

## 2021-10-25 DIAGNOSIS — Z30.09 GENERAL COUNSELING AND ADVICE ON CONTRACEPTIVE MANAGEMENT: ICD-10-CM

## 2021-10-25 DIAGNOSIS — Z12.4 SCREENING FOR CERVICAL CANCER: ICD-10-CM

## 2021-10-25 PROBLEM — O20.0 THREATENED ABORTION: Status: RESOLVED | Noted: 2021-02-02 | Resolved: 2021-10-25

## 2021-10-25 PROBLEM — O99.820 GBS (GROUP B STREPTOCOCCUS CARRIER), +RV CULTURE, CURRENTLY PREGNANT: Status: RESOLVED | Noted: 2021-09-01 | Resolved: 2021-10-25

## 2021-10-25 PROBLEM — O26.849 UTERINE SIZE DATE DISCREPANCY PREGNANCY: Status: RESOLVED | Noted: 2021-01-29 | Resolved: 2021-10-25

## 2021-10-25 PROBLEM — Z34.90 PREGNANCY: Status: RESOLVED | Noted: 2021-01-29 | Resolved: 2021-10-25

## 2021-10-25 PROBLEM — R12 HEARTBURN: Status: RESOLVED | Noted: 2021-05-10 | Resolved: 2021-10-25

## 2021-10-25 PROCEDURE — 0503F POSTPARTUM CARE VISIT: CPT | Performed by: OBSTETRICS & GYNECOLOGY

## 2021-10-25 NOTE — PROGRESS NOTES
"Chief Complaint   Patient presents with   • Postpartum Care       6 Week Postpartum Visit         Brittny Hill is a 26 y.o.  s/p  at 37 weeks on 2021, who presents today for a 6 week postpartum check.      At the time of delivery were you diagnosed with any of the following: None. The patient did not have a laceration or episiotomy  and is healing well.    Patient denies postpartum depression.  Patient describes bleeding as light.  Patient is breast feeding.  Desires contraceptive methods: None for contraception.  Patient denies bowel or bladder issues.    Postpartum Depression Screening Questionnaire: 5, No treatment indicated.  Baby Name: Tamar  Baby Weight: 7lb  Baby Discharged: Discharged with Mom  Delivering Physician: Timbo    Last Completed Pap Smear          PAP SMEAR (Every 3 Years) Next due on 2/10/2024    02/10/2021  Pap IG, Ct-Ng TV Rfx HPV All              Is the patient due for a pap today? No    The additional following portions of the patient's history were reviewed and updated as appropriate: allergies, current medications, past family history, past medical history, past social history, past surgical history and problem list.    Review of Systems   Constitutional: Negative.    HENT: Negative.    Eyes: Negative.    Respiratory: Negative.    Cardiovascular: Negative.    Gastrointestinal: Negative.    Endocrine: Negative.    Genitourinary: Negative.    Musculoskeletal: Negative.    Skin: Negative.    Allergic/Immunologic: Negative.    Neurological: Negative.    Hematological: Negative.    Psychiatric/Behavioral: Negative.      All other systems reviewed and are negative.     I have reviewed and agree with the HPI, ROS, and historical information as entered above. Brandon Holm MD    /62   Ht 167.6 cm (66\")   Wt 66.7 kg (147 lb)   LMP 2020   BMI 23.73 kg/m²     Physical Exam  Vitals and nursing note reviewed. Exam conducted with a chaperone present. "   Constitutional:       Appearance: She is well-developed.   HENT:      Head: Normocephalic and atraumatic.   Pulmonary:      Effort: Pulmonary effort is normal.   Abdominal:      Palpations: Abdomen is soft. Abdomen is not rigid.   Genitourinary:     Vagina: Bleeding present. No lesions or prolapsed vaginal walls.      Cervix: Cervical bleeding present. No lesion or erythema.      Uterus: Not enlarged, not tender and no uterine prolapse.       Adnexa:         Right: No mass, tenderness or fullness.          Left: No mass, tenderness or fullness.        Comments: Dark brown blood present.  Cervix closed and mid position.  Uterus normal size nontender and retroverted.  No adnexal masses or tenderness.  Musculoskeletal:      Cervical back: Normal range of motion.   Neurological:      Mental Status: She is alert and oriented to person, place, and time.   Psychiatric:         Mood and Affect: Mood normal.         Behavior: Behavior normal.             Assessment and Plan    Problem List Items Addressed This Visit     Screening for cervical cancer    Overview     Pap normal 1/29/2021         Retroverted uterus    General counseling and advice on contraceptive management    Overview     Contraceptive options discussed.  Patient is currently breast-feeding.  Declines oral contraceptives or IUD at this time.           Other Visit Diagnoses     Postpartum follow-up    -  Primary          1. S/p Vaginal delivery, 6 weeks postpartum.  Doing well.  Patient is breast-feeding.  Having a return of dark brown spotting.  2. Discussed contraceptive options.  Declines OCPs or IUD or Nexplanon.  Call if changes her mind.  3. Return to normal physical activity.  No pelvic restrictions.   4. Contraception: contraceptive methods: Condoms  5. Return in about 14 weeks (around 1/31/2022) for Annual physical.     Brandon Holm MD  10/25/2021

## 2021-11-24 ENCOUNTER — TELEPHONE (OUTPATIENT)
Dept: OBSTETRICS AND GYNECOLOGY | Facility: CLINIC | Age: 26
End: 2021-11-24

## 2021-11-24 NOTE — TELEPHONE ENCOUNTER
Patient called and reported that she feels as though she has a UTI, she requests that something be called in for her to take that would be safe to take while breastfeeding.

## 2021-11-24 NOTE — TELEPHONE ENCOUNTER
HPI     Tristen Amezquita is a 14 y.o. male who is brought in by his mother,   Donis,  for continued eye care.  Tristen's initial exam with me was on   03/22/2018.  At that time, he was noted to have bilateral astigmatism (no   needed correction at that time). Mom reports that she has not noticed any   new or concerning ocular or visual symptoms.   Tristen is on the autistic spectrum.    (--)blurred vision  (--)Headaches  (--)diplopia  (--)flashes  (--)floaters  (--)pain  (--)Itching  (+)tearing  (--)burning  (--)Dryness  (--) OTC Drops  (+)Photophobia      Last edited by Shanita James, OD on 11/5/2020  2:22 PM. (History)        Review of Systems   Constitutional: Negative for chills, fever and malaise/fatigue.   HENT: Negative for congestion and hearing loss.    Eyes: Negative for blurred vision, double vision, photophobia, pain, discharge and redness.   Respiratory: Negative.    Cardiovascular: Negative.    Gastrointestinal: Negative.    Genitourinary: Negative.    Musculoskeletal: Negative.    Skin: Negative.    Neurological: Negative for seizures.   Endo/Heme/Allergies: Negative for environmental allergies.   Psychiatric/Behavioral: Negative.        For exam results, see encounter report    Assessment /Plan     1. Age-Normal Hyperopia with good ocular alignment and good ocular health OU  - no treatment needed at this time    2. Family history of glaucoma & ARMD  - Healthy optic nerves  - no treatment needed at this time    Parent education; RTC in 1 year with Cycloplegic refraction and DFE; Ok to instill Cycloplegic mix  after (normal) baseline workup, sooner as needed                                                      Patient advised that she would need to be seen so that the providers would know for sure what to treat. Patient says that's he is out of town. This RN recommended a local [facility]. Patient FRITZ.

## 2022-01-19 ENCOUNTER — TELEPHONE (OUTPATIENT)
Dept: OBSTETRICS AND GYNECOLOGY | Facility: CLINIC | Age: 27
End: 2022-01-19

## 2022-01-19 NOTE — TELEPHONE ENCOUNTER
Informed patient of OTC medications she may take, according to back of pregnancy book she received. Avoid any OTC meds with alcohol or decongestants. Verbalized understanding.

## 2022-02-01 ENCOUNTER — TELEPHONE (OUTPATIENT)
Dept: OBSTETRICS AND GYNECOLOGY | Facility: CLINIC | Age: 27
End: 2022-02-01

## 2022-02-01 NOTE — TELEPHONE ENCOUNTER
Patient called and stated that she had some questions re: prenatal prescription. Request to speak with nurse.

## 2022-02-02 RX ORDER — PRENATAL VIT,CAL 76/IRON/FOLIC 29 MG-1 MG
1 TABLET ORAL DAILY
Qty: 30 TABLET | Refills: 11 | Status: SHIPPED | OUTPATIENT
Start: 2022-02-02 | End: 2023-02-08 | Stop reason: SDUPTHER

## 2022-02-02 NOTE — TELEPHONE ENCOUNTER
Reviewed historical prescriptions. Patient states she did not have chewables. Prescription sent mirroring 1/29/21 order. Pharmacy confirmed.

## 2022-02-07 ENCOUNTER — OFFICE VISIT (OUTPATIENT)
Dept: OBSTETRICS AND GYNECOLOGY | Facility: CLINIC | Age: 27
End: 2022-02-07

## 2022-02-07 VITALS
WEIGHT: 150.8 LBS | HEIGHT: 66 IN | DIASTOLIC BLOOD PRESSURE: 70 MMHG | BODY MASS INDEX: 24.23 KG/M2 | SYSTOLIC BLOOD PRESSURE: 118 MMHG

## 2022-02-07 DIAGNOSIS — Z30.09 GENERAL COUNSELING AND ADVICE ON CONTRACEPTIVE MANAGEMENT: ICD-10-CM

## 2022-02-07 DIAGNOSIS — Z12.4 SCREENING FOR CERVICAL CANCER: Primary | ICD-10-CM

## 2022-02-07 DIAGNOSIS — N85.4 RETROVERTED UTERUS: ICD-10-CM

## 2022-02-07 PROCEDURE — 99395 PREV VISIT EST AGE 18-39: CPT | Performed by: OBSTETRICS & GYNECOLOGY

## 2022-02-07 PROCEDURE — 3008F BODY MASS INDEX DOCD: CPT | Performed by: OBSTETRICS & GYNECOLOGY

## 2022-02-07 NOTE — PROGRESS NOTES
GYN Annual Exam     CC - Here for annual exam.        HPI  Brittny Hill is a 26 y.o. female, , who presents for annual well woman exam. Patient's last menstrual period was 2022 (exact date)..  Periods are irregular since delivery of her child 5 months ago. She may have spotting only or light flow that can last up to 8 days.  Dysmenorrhea:moderate, occurring premenstrually.  Patient reports problems with: none.  There were no changes to her medical or surgical history since her last visit.. Partner Status: Marital Status: .  She is sexually active. She has not had new partners since her last STD testing. She does not desire STD testing.     Wants pap today     Additional OB/GYN History   Current contraception: contraceptive methods: Condoms  Desires to: continue contraception  Last Pap : 2/10/21. Result: neg, std neg   Last Completed Pap Smear          Ordered - PAP SMEAR (Every 3 Years) Ordered on 2022    02/10/2021  Pap IG, Ct-Ng TV Rfx HPV All              History of abnormal Pap smear: no  Family history of uterine, colon, breast, or ovarian cancer: no  Performs monthly Self-Breast Exam: yes  Exercises Regularly:yes- walking  Feelings of Anxiety or Depression: no  Tobacco Usage?: No   OB History        2    Para   2    Term   2       0    AB   0    Living   2       SAB   0    IAB   0    Ectopic   0    Molar   0    Multiple   0    Live Births   2                Health Maintenance   Topic Date Due   • Annual Gynecologic Pelvic and Breast Exam  Never done   • ANNUAL PHYSICAL  Never done   • COVID-19 Vaccine (1) Never done   • HPV VACCINES (1 - 2-dose series) Never done   • TDAP/TD VACCINES (1 - Tdap) Never done   • INFLUENZA VACCINE  Never done   • PAP SMEAR  02/10/2024   • HEPATITIS C SCREENING  Completed   • Pneumococcal Vaccine 0-64  Aged Out       The additional following portions of the patient's history were reviewed and updated as appropriate: allergies, current  "medications, past family history, past medical history, past social history, past surgical history and problem list.    Review of Systems   Constitutional: Negative.    HENT: Negative.    Eyes: Negative.    Respiratory: Negative.    Cardiovascular: Negative.    Gastrointestinal: Negative.    Endocrine: Negative.    Genitourinary: Negative.    Musculoskeletal: Negative.    Skin: Negative.    Allergic/Immunologic: Negative.    Neurological: Negative.    Hematological: Negative.    Psychiatric/Behavioral: Negative.          I have reviewed and agree with the HPI, ROS, and historical information as entered above. Brandon Holm MD    Objective   /70   Ht 167.6 cm (66\")   Wt 68.4 kg (150 lb 12.8 oz)   LMP 01/09/2022 (Exact Date)   BMI 24.34 kg/m²     Physical Exam  Vitals and nursing note reviewed. Exam conducted with a chaperone present.   Constitutional:       Appearance: She is well-developed.   HENT:      Head: Normocephalic and atraumatic.   Neck:      Thyroid: No thyroid mass or thyromegaly.   Cardiovascular:      Rate and Rhythm: Normal rate and regular rhythm.      Heart sounds: No murmur heard.      Pulmonary:      Effort: Pulmonary effort is normal. No retractions.      Breath sounds: Normal breath sounds. No wheezing, rhonchi or rales.   Chest:      Chest wall: No mass or tenderness.   Breasts:      Right: Normal. No mass, nipple discharge, skin change or tenderness.      Left: Normal. No mass, nipple discharge, skin change or tenderness.       Abdominal:      General: Bowel sounds are normal.      Palpations: Abdomen is soft. Abdomen is not rigid. There is no mass.      Tenderness: There is no abdominal tenderness. There is no guarding.      Hernia: No hernia is present. There is no hernia in the left inguinal area.   Genitourinary:     Labia:         Right: No rash, tenderness or lesion.         Left: No rash, tenderness or lesion.       Vagina: Normal. No vaginal discharge or lesions.      " Cervix: No cervical motion tenderness, discharge, lesion or cervical bleeding.      Uterus: Normal. Not enlarged, not fixed and not tender.       Adnexa:         Right: No mass or tenderness.          Left: No mass or tenderness.        Rectum: No external hemorrhoid.   Musculoskeletal:      Cervical back: Normal range of motion. No muscular tenderness.   Neurological:      Mental Status: She is alert and oriented to person, place, and time.   Psychiatric:         Behavior: Behavior normal.            Assessment and Plan    Problem List Items Addressed This Visit     Screening for cervical cancer - Primary    Overview     Pap normal 1/29/2021         Relevant Orders    Pap IG, HPV-hr    Retroverted uterus    General counseling and advice on contraceptive management    Overview     Contraceptive options discussed.  Patient is currently breast-feeding.  Using condoms.   Declines oral contraceptives or IUD at this time.               1. GYN annual well woman exam.  26 years of age.  5 months postpartum.  Menses just resumed.  2. Using condoms for contraception.  Other options reviewed.  3. Reviewed monthly self breast exams.  Instructed to call with lumps, pain, or breast discharge.    4. Reviewed exercise as a preventative health measures.   5. Reccommended Flu Vaccine in Fall of each year.  6. RTC in 1 year or PRN with problems  Return in about 1 year (around 2/7/2023) for Annual physical.      Brandon Holm MD  02/07/2022

## 2022-02-22 DIAGNOSIS — Z12.4 SCREENING FOR CERVICAL CANCER: ICD-10-CM

## 2022-11-23 ENCOUNTER — TELEPHONE (OUTPATIENT)
Dept: OBSTETRICS AND GYNECOLOGY | Facility: CLINIC | Age: 27
End: 2022-11-23

## 2022-11-23 NOTE — TELEPHONE ENCOUNTER
Dr. Holm patient      Informed patient that she can take Tylenol and Motrin without fetal risk or effect on milk supply. She v/u.

## 2022-11-23 NOTE — TELEPHONE ENCOUNTER
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

## 2023-02-08 ENCOUNTER — OFFICE VISIT (OUTPATIENT)
Dept: OBSTETRICS AND GYNECOLOGY | Facility: CLINIC | Age: 28
End: 2023-02-08
Payer: MEDICAID

## 2023-02-08 VITALS
SYSTOLIC BLOOD PRESSURE: 94 MMHG | DIASTOLIC BLOOD PRESSURE: 62 MMHG | HEIGHT: 66 IN | WEIGHT: 133.8 LBS | BODY MASS INDEX: 21.5 KG/M2

## 2023-02-08 DIAGNOSIS — Z30.09 GENERAL COUNSELING AND ADVICE ON CONTRACEPTIVE MANAGEMENT: ICD-10-CM

## 2023-02-08 DIAGNOSIS — N85.4 RETROVERTED UTERUS: ICD-10-CM

## 2023-02-08 DIAGNOSIS — Z12.4 SCREENING FOR CERVICAL CANCER: Primary | ICD-10-CM

## 2023-02-08 PROCEDURE — 99395 PREV VISIT EST AGE 18-39: CPT | Performed by: OBSTETRICS & GYNECOLOGY

## 2023-02-08 PROCEDURE — 3008F BODY MASS INDEX DOCD: CPT | Performed by: OBSTETRICS & GYNECOLOGY

## 2023-02-08 PROCEDURE — 2014F MENTAL STATUS ASSESS: CPT | Performed by: OBSTETRICS & GYNECOLOGY

## 2023-02-08 RX ORDER — PRENATAL VIT,CAL 76/IRON/FOLIC 29 MG-1 MG
1 TABLET ORAL DAILY
Qty: 30 TABLET | Refills: 11 | Status: SHIPPED | OUTPATIENT
Start: 2023-02-08

## 2023-02-08 NOTE — PROGRESS NOTES
Gynecologic Annual Exam Note        Gynecologic Exam        Subjective     HPI  Brittny Hill is a 27 y.o.  female who presents for annual well woman exam as a established patient. There were no changes to her medical or surgical history since her last visit.. Patient reports problems with: none. Patient's last menstrual period was 2023.. Her periods occur every other month, lasting 6 days. The flow is moderate.. She reports dysmenorrhea is moderate, occurring first 1-2 days of flow. Partner Status: Marital Status: .  She is sexually active. She has not had new partners.. STD testing recommendations have been explained to the patient and she does not desire STD testing.    Additional OB/GYN History   Current contraception: contraceptive methods: Condoms  Desires to: continue contraception  Thromboembolic Disease: none  Age of menarche: 14    History of STD: no    Last Pap : 22. Results: negative. HPV: negative.   Last Completed Pap Smear          Ordered - PAP SMEAR (Every 3 Years) Ordered on 2022  SCANNED - PAP SMEAR    02/10/2021  Pap IG, Ct-Ng TV Rfx HPV All                 History of abnormal Pap smear: no  Gardasil status:None  Family history of uterine, colon, breast, or ovarian cancer: no  Performs monthly Self-Breast Exam: yes  Exercises Regularly:yes  Feelings of Anxiety or Depression: no  Tobacco Usage?: No       Current Outpatient Medications:   •  prenatal vitamins (PRENATABS RX) 29-1 MG tablet tablet, Take 1 tablet by mouth Daily., Disp: 30 tablet, Rfl: 11     Patient is requesting refills of Prenatal vitamins.    OB History        2    Para   2    Term   2       0    AB   0    Living   2       SAB   0    IAB   0    Ectopic   0    Molar   0    Multiple   0    Live Births   2                Health Maintenance   Topic Date Due   • COVID-19 Vaccine (1) Never done   • HPV VACCINES (1 - 2-dose series) Never done   • TDAP/TD VACCINES (1 -  "Tdap) Never done   • ANNUAL PHYSICAL  Never done   • INFLUENZA VACCINE  Never done   • Annual Gynecologic Pelvic and Breast Exam  02/08/2023   • PAP SMEAR  02/07/2025   • HEPATITIS C SCREENING  Completed   • Pneumococcal Vaccine 0-64  Aged Out   • CHLAMYDIA SCREENING  Discontinued       Past Medical History:   Diagnosis Date   • GBS (group B Streptococcus carrier), +RV culture, currently pregnant 9/1/2021    Group B strep screen positive   • Mild hyperemesis gravidarum 1/29/2021    Discussed OTC Unisom and Vit B6 Rx phenergan given.    • Uterine size date discrepancy pregnancy 1/29/2021    Measures small for dates;  U/s for growth at 34 wk. > 51%        Past Surgical History:   Procedure Laterality Date   • FOOT SURGERY      at age 13 on left foot       The additional following portions of the patient's history were reviewed and updated as appropriate: allergies, current medications, past family history, past medical history, past social history, past surgical history and problem list.    Review of Systems      I have reviewed and agree with the HPI, ROS, and historical information as entered above. Brandon Holm MD        Objective   BP 94/62   Ht 167.6 cm (66\")   Wt 60.7 kg (133 lb 12.8 oz)   LMP 01/29/2023   Breastfeeding Yes   BMI 21.60 kg/m²     Physical Exam       Assessment and Plan    Problem List Items Addressed This Visit     Screening for cervical cancer - Primary    Overview     Pap normal 1/29/2021  Pap smear 2/7/2022 was negative. HPV high risk pool negative.         Retroverted uterus    General counseling and advice on contraceptive management    Overview     Contraceptive options discussed.  Patient is currently breast-feeding.  Using condoms.   Declines oral contraceptives or IUD at this time.        Other Visit Diagnoses     Postpartum follow-up        Relevant Medications    prenatal vitamins (PRENATABS RX) 29-1 MG tablet tablet          1. GYN annual well woman exam.  27 years of age. "  Regular menses.  Still breast-feeding.  2. Contraceptive options discussed.  Declines.  Using condoms.  Refill prenatal vitamins.    3. Reviewed pap guidelines.  Pap with HPV negative in 2022.  Repeat in 3 years.  4. Reviewed monthly self breast exams.  Instructed to call with lumps, pain, or breast discharge.    5. Reccommended Flu Vaccine in Fall of each year.  6. RTC in 1 year or PRN with problems  Return in about 1 year (around 2/8/2024) for Annual physical.      Brandon Holm MD  02/08/2023

## 2024-03-29 ENCOUNTER — OFFICE VISIT (OUTPATIENT)
Dept: OBSTETRICS AND GYNECOLOGY | Facility: CLINIC | Age: 29
End: 2024-03-29
Payer: MEDICAID

## 2024-03-29 VITALS
BODY MASS INDEX: 21.95 KG/M2 | DIASTOLIC BLOOD PRESSURE: 78 MMHG | WEIGHT: 136.6 LBS | HEIGHT: 66 IN | SYSTOLIC BLOOD PRESSURE: 118 MMHG

## 2024-03-29 DIAGNOSIS — N94.6 DYSMENORRHEA: ICD-10-CM

## 2024-03-29 DIAGNOSIS — Z30.09 GENERAL COUNSELING AND ADVICE ON CONTRACEPTIVE MANAGEMENT: ICD-10-CM

## 2024-03-29 DIAGNOSIS — Z12.4 SCREENING FOR CERVICAL CANCER: Primary | ICD-10-CM

## 2024-03-29 RX ORDER — PRENATAL VIT,CAL 76/IRON/FOLIC 29 MG-1 MG
1 TABLET ORAL DAILY
Qty: 30 TABLET | Refills: 11 | Status: SHIPPED | OUTPATIENT
Start: 2024-03-29 | End: 2025-03-29

## 2024-03-29 NOTE — PROGRESS NOTES
Gynecologic Annual Exam Note        Gynecologic Exam        Subjective     HPI  Brittny Hill is a 29 y.o.  female who presents for annual well woman exam as a established patient. There were no changes to her medical or surgical history since her last visit.. Patient's last menstrual period was 2024 (exact date). Her periods occur every month, lasting 5-7 days.  The flow is light. She reports dysmenorrhea is severe occurring premenstrually and first 1-2 days of flow. Marital Status: .  She is sexually active. She has not had new partners.. STD testing recommendations have been explained to the patient and she does not desire STD testing.    The patient would like to discuss the following complaints today: She is afraid of early menopause because her periods are lighter than usual. She would like a prenatal vitamin prescription. She reports that she has previously been low on estrogen, she would like to recheck it today.    Additional OB/GYN History   contraceptive methods: Condoms  Desires to: do not start contraception  Thromboembolic Disease: none  History of migraines: yes without aura  Age of menarche: 14    History of STD: no    Last Pap : 22. Results: negative. HPV: negative.   Last Completed Pap Smear            PAP SMEAR (Every 3 Years) Next due on 2022  SCANNED - PAP SMEAR    02/10/2021  Pap IG, Ct-Ng TV Rfx HPV All                     History of abnormal Pap smear: no  Gardasil status: none  Family history of uterine, colon, breast, or ovarian cancer: no  Performs monthly Self-Breast Exam: no  Exercises Regularly:no  Feelings of Anxiety or Depression: no  Tobacco Usage?: No       Current Outpatient Medications:     prenatal vitamins (PRENATABS RX) 29-1 MG tablet tablet, Take 1 tablet by mouth Daily., Disp: 30 tablet, Rfl: 11     Patient denies the need for medication refills today.    OB History          2    Para   2    Term   2       0  "   AB   0    Living   2         SAB   0    IAB   0    Ectopic   0    Molar   0    Multiple   0    Live Births   2                Health Maintenance   Topic Date Due    TDAP/TD VACCINES (1 - Tdap) Never done    ANNUAL PHYSICAL  Never done    Annual Gynecologic Pelvic and Breast Exam  02/08/2023    INFLUENZA VACCINE  Never done    COVID-19 Vaccine (1 - 2023-24 season) Never done    PAP SMEAR  02/07/2025    HEPATITIS C SCREENING  Completed    Pneumococcal Vaccine 0-64  Aged Out    CHLAMYDIA SCREENING  Discontinued       Past Medical History:   Diagnosis Date    History of hyperemesis gravidarum         Past Surgical History:   Procedure Laterality Date    FOOT SURGERY      at age 13 on left foot       The additional following portions of the patient's history were reviewed and updated as appropriate: allergies, current medications, past family history, past medical history, past social history, past surgical history, and problem list.    Review of Systems   Constitutional: Negative.    HENT: Negative.     Eyes: Negative.    Respiratory: Negative.     Cardiovascular: Negative.    Gastrointestinal: Negative.    Endocrine: Negative.    Genitourinary: Negative.    Musculoskeletal: Negative.    Skin: Negative.    Allergic/Immunologic: Negative.    Neurological: Negative.    Hematological: Negative.    Psychiatric/Behavioral: Negative.           I have reviewed and agree with the HPI, ROS, and historical information as entered above.   Brandon Holm MD          Objective   /78 (BP Location: Right arm, Patient Position: Sitting, Cuff Size: Adult)   Ht 167.6 cm (66\")   Wt 62 kg (136 lb 9.6 oz)   LMP 03/24/2024 (Exact Date)   Breastfeeding No   BMI 22.05 kg/m²     Physical Exam  Vitals and nursing note reviewed. Exam conducted with a chaperone present.   Constitutional:       Appearance: Normal appearance. She is well-developed.   HENT:      Head: Normocephalic and atraumatic.   Neck:      Thyroid: No thyroid " mass or thyromegaly.   Cardiovascular:      Rate and Rhythm: Normal rate and regular rhythm.      Heart sounds: Normal heart sounds. No murmur heard.  Pulmonary:      Effort: Pulmonary effort is normal. No retractions.      Breath sounds: Normal breath sounds. No wheezing, rhonchi or rales.   Chest:      Chest wall: No mass or tenderness.   Breasts:     Right: Normal. No mass, nipple discharge, skin change or tenderness.      Left: Normal. No mass, nipple discharge, skin change or tenderness.   Abdominal:      General: Bowel sounds are normal.      Palpations: Abdomen is soft. Abdomen is not rigid. There is no mass.      Tenderness: There is no abdominal tenderness. There is no guarding.      Hernia: No hernia is present. There is no hernia in the left inguinal area.   Genitourinary:     General: Normal vulva.      Exam position: Lithotomy position.      Labia:         Right: No rash, tenderness or lesion.         Left: No rash, tenderness or lesion.       Vagina: Normal. No vaginal discharge or lesions.      Cervix: No cervical motion tenderness, discharge, lesion or cervical bleeding.      Uterus: Normal. Not enlarged, not fixed and not tender.       Adnexa:         Right: No mass or tenderness.          Left: No mass or tenderness.        Rectum: No external hemorrhoid.      Comments: No vaginal discharge.  Cervix without lesions or discharge.  Uterus is mid position normal size nontender.  No adnexal masses or tenderness.  Musculoskeletal:      Cervical back: Normal range of motion. No muscular tenderness.   Neurological:      Mental Status: She is alert and oriented to person, place, and time.   Psychiatric:         Behavior: Behavior normal.            Assessment and Plan    Problem List Items Addressed This Visit          Gynecologic and Obstetric Problems    Dysmenorrhea    Overview     3/29/2024; menses are light.  Dysmenorrhea days 1 and 2 of cycle.  Can try Aleve; declines Rx for OCPs.            Other     General counseling and advice on contraceptive management    Overview     Contraceptive options discussed.  Patient is currently breast-feeding.  Using condoms.   Declines oral contraceptives or IUD at this time.         Screening for cervical cancer - Primary    Overview     Pap normal 1/29/2021  Pap smear 2/7/2022 was negative. HPV high risk pool negative.            GYN annual well woman exam.  29 years of age.  Declines Rx for contraception.  Concerns regarding emotional changes with hormones.  States menses are regular but very light.  Dysmenorrhea days 1 and 2 of menses.  Currently taking Tylenol.  Can try Aleve.  Reviewed pap guidelines.  Pap smear 2/7/2022 was negative with negative HPV screen.  Repeat in 3 years.  Request refill of prenatal vitamins.  Reviewed monthly self breast exams.  Instructed to call with lumps, pain, or breast discharge.    Reviewed exercise as a preventative health measures.   Reccommended Flu Vaccine in Fall of each year.  RTC in 1 year or PRN with problems  Return in about 1 year (around 3/29/2025) for Annual physical.    Brandon Holm MD  03/29/2024

## 2024-09-23 ENCOUNTER — TELEPHONE (OUTPATIENT)
Dept: OBSTETRICS AND GYNECOLOGY | Facility: CLINIC | Age: 29
End: 2024-09-23
Payer: MEDICAID

## 2024-09-25 ENCOUNTER — INITIAL PRENATAL (OUTPATIENT)
Dept: OBSTETRICS AND GYNECOLOGY | Facility: CLINIC | Age: 29
End: 2024-09-25
Payer: MEDICAID

## 2024-09-25 VITALS — BODY MASS INDEX: 22.56 KG/M2 | SYSTOLIC BLOOD PRESSURE: 102 MMHG | WEIGHT: 139.8 LBS | DIASTOLIC BLOOD PRESSURE: 62 MMHG

## 2024-09-25 DIAGNOSIS — Z34.90 PREGNANCY, UNSPECIFIED GESTATIONAL AGE: Primary | ICD-10-CM

## 2024-09-25 DIAGNOSIS — O21.9 NAUSEA/VOMITING IN PREGNANCY: ICD-10-CM

## 2024-09-25 LAB
APPEARANCE UR: ABNORMAL
BACTERIA #/AREA URNS HPF: ABNORMAL /HPF
BILIRUB UR QL STRIP: NEGATIVE
CASTS URNS MICRO: ABNORMAL
COLOR UR: ABNORMAL
CRYSTALS URNS MICRO: ABNORMAL
EPI CELLS #/AREA URNS HPF: ABNORMAL /HPF
GLUCOSE UR QL STRIP: NEGATIVE
HGB UR QL STRIP: NEGATIVE
KETONES UR QL STRIP: ABNORMAL
LEUKOCYTE ESTERASE UR QL STRIP: ABNORMAL
NITRITE UR QL STRIP: NEGATIVE
PH UR STRIP: 6 [PH] (ref 5–8)
PROT UR QL STRIP: ABNORMAL
RBC #/AREA URNS HPF: ABNORMAL /HPF
SP GR UR STRIP: >1.03 (ref 1–1.03)
UROBILINOGEN UR STRIP-MCNC: ABNORMAL MG/DL
WBC #/AREA URNS HPF: ABNORMAL /HPF

## 2024-09-25 PROCEDURE — 99214 OFFICE O/P EST MOD 30 MIN: CPT | Performed by: OBSTETRICS & GYNECOLOGY

## 2024-09-25 RX ORDER — LANOLIN ALCOHOL/MO/W.PET/CERES
50 CREAM (GRAM) TOPICAL DAILY
COMMUNITY

## 2024-09-25 RX ORDER — PRENATAL VIT,CAL 76/IRON/FOLIC 29 MG-1 MG
1 TABLET ORAL DAILY
Qty: 30 TABLET | Refills: 11 | Status: SHIPPED | OUTPATIENT
Start: 2024-09-25 | End: 2025-09-25

## 2024-09-25 RX ORDER — PROMETHAZINE HYDROCHLORIDE 25 MG/1
25 TABLET ORAL EVERY 6 HOURS PRN
Qty: 25 TABLET | Refills: 4 | Status: SHIPPED | OUTPATIENT
Start: 2024-09-25

## 2024-09-26 LAB
ABO GROUP BLD: NORMAL
AMPHETAMINES UR QL SCN: NEGATIVE NG/ML
BARBITURATES UR QL SCN: NEGATIVE NG/ML
BASOPHILS # BLD AUTO: 0 X10E3/UL (ref 0–0.2)
BASOPHILS NFR BLD AUTO: 0 %
BENZODIAZ UR QL SCN: NEGATIVE NG/ML
BLD GP AB SCN SERPL QL: NEGATIVE
BZE UR QL SCN: NEGATIVE NG/ML
CANNABINOIDS UR QL SCN: NEGATIVE NG/ML
CREAT UR-MCNC: 358.5 MG/DL (ref 20–300)
EOSINOPHIL # BLD AUTO: 0 X10E3/UL (ref 0–0.4)
EOSINOPHIL NFR BLD AUTO: 0 %
ERYTHROCYTE [DISTWIDTH] IN BLOOD BY AUTOMATED COUNT: 11.9 % (ref 11.7–15.4)
HBV SURFACE AG SERPL QL IA: NEGATIVE
HCT VFR BLD AUTO: 37.1 % (ref 34–46.6)
HCV IGG SERPL QL IA: NON REACTIVE
HGB BLD-MCNC: 12.4 G/DL (ref 11.1–15.9)
HIV 1+2 AB+HIV1 P24 AG SERPL QL IA: NON REACTIVE
IMM GRANULOCYTES # BLD AUTO: 0 X10E3/UL (ref 0–0.1)
IMM GRANULOCYTES NFR BLD AUTO: 0 %
LABORATORY COMMENT REPORT: ABNORMAL
LYMPHOCYTES # BLD AUTO: 1.1 X10E3/UL (ref 0.7–3.1)
LYMPHOCYTES NFR BLD AUTO: 17 %
MCH RBC QN AUTO: 30.3 PG (ref 26.6–33)
MCHC RBC AUTO-ENTMCNC: 33.4 G/DL (ref 31.5–35.7)
MCV RBC AUTO: 91 FL (ref 79–97)
METHADONE UR QL SCN: NEGATIVE NG/ML
MONOCYTES # BLD AUTO: 0.5 X10E3/UL (ref 0.1–0.9)
MONOCYTES NFR BLD AUTO: 7 %
NEUTROPHILS # BLD AUTO: 4.8 X10E3/UL (ref 1.4–7)
NEUTROPHILS NFR BLD AUTO: 76 %
OPIATES UR QL SCN: NEGATIVE NG/ML
OXYCODONE+OXYMORPHONE UR QL SCN: NEGATIVE NG/ML
PCP UR QL: NEGATIVE NG/ML
PH UR: 5.6 [PH] (ref 4.5–8.9)
PLATELET # BLD AUTO: 278 X10E3/UL (ref 150–450)
PROGEST SERPL-MCNC: 17.2 NG/ML
PROPOXYPH UR QL SCN: NEGATIVE NG/ML
RBC # BLD AUTO: 4.09 X10E6/UL (ref 3.77–5.28)
RH BLD: POSITIVE
RPR SER QL: NON REACTIVE
RUBV IGG SERPL IA-ACNC: 2.1 INDEX
WBC # BLD AUTO: 6.4 X10E3/UL (ref 3.4–10.8)

## 2024-09-27 ENCOUNTER — TELEPHONE (OUTPATIENT)
Dept: OBSTETRICS AND GYNECOLOGY | Facility: CLINIC | Age: 29
End: 2024-09-27
Payer: MEDICAID

## 2024-09-27 LAB
BACTERIA UR CULT: ABNORMAL
C TRACH RRNA SPEC QL NAA+PROBE: NEGATIVE
N GONORRHOEA RRNA SPEC QL NAA+PROBE: NEGATIVE

## 2024-09-27 RX ORDER — AMOXICILLIN 500 MG/1
500 CAPSULE ORAL 3 TIMES DAILY
Qty: 21 CAPSULE | Refills: 0 | Status: SHIPPED | OUTPATIENT
Start: 2024-09-27 | End: 2024-10-04

## 2024-09-29 ENCOUNTER — HOSPITAL ENCOUNTER (EMERGENCY)
Facility: HOSPITAL | Age: 29
Discharge: HOME | End: 2024-09-30
Attending: EMERGENCY MEDICINE | Admitting: EMERGENCY MEDICINE
Payer: MEDICAID

## 2024-09-29 DIAGNOSIS — O21.9 NAUSEA AND VOMITING IN PREGNANCY PRIOR TO 22 WEEKS GESTATION: Primary | ICD-10-CM

## 2024-09-29 LAB
ALBUMIN SERPL-MCNC: 4.6 G/DL (ref 3.5–5.2)
ALBUMIN/GLOB SERPL: 1.6 G/DL
ALP SERPL-CCNC: 43 U/L (ref 39–117)
ALT SERPL W P-5'-P-CCNC: 11 U/L (ref 1–33)
ANION GAP SERPL CALCULATED.3IONS-SCNC: 12 MMOL/L (ref 5–15)
AST SERPL-CCNC: 17 U/L (ref 1–32)
BASOPHILS # BLD AUTO: 0.01 10*3/MM3 (ref 0–0.2)
BASOPHILS NFR BLD AUTO: 0.1 % (ref 0–1.5)
BILIRUB SERPL-MCNC: 0.6 MG/DL (ref 0–1.2)
BUN SERPL-MCNC: 7 MG/DL (ref 6–20)
BUN/CREAT SERPL: 14.9 (ref 7–25)
CALCIUM SPEC-SCNC: 9.1 MG/DL (ref 8.6–10.5)
CHLORIDE SERPL-SCNC: 99 MMOL/L (ref 98–107)
CO2 SERPL-SCNC: 25 MMOL/L (ref 22–29)
CREAT SERPL-MCNC: 0.47 MG/DL (ref 0.57–1)
DEPRECATED RDW RBC AUTO: 36.4 FL (ref 37–54)
EGFRCR SERPLBLD CKD-EPI 2021: 133.2 ML/MIN/1.73
EOSINOPHIL # BLD AUTO: 0.02 10*3/MM3 (ref 0–0.4)
EOSINOPHIL NFR BLD AUTO: 0.3 % (ref 0.3–6.2)
ERYTHROCYTE [DISTWIDTH] IN BLOOD BY AUTOMATED COUNT: 11.6 % (ref 12.3–15.4)
GLOBULIN UR ELPH-MCNC: 2.8 GM/DL
GLUCOSE SERPL-MCNC: 92 MG/DL (ref 65–99)
HCT VFR BLD AUTO: 37.2 % (ref 34–46.6)
HGB BLD-MCNC: 13.2 G/DL (ref 12–15.9)
IMM GRANULOCYTES # BLD AUTO: 0.02 10*3/MM3 (ref 0–0.05)
IMM GRANULOCYTES NFR BLD AUTO: 0.3 % (ref 0–0.5)
LYMPHOCYTES # BLD AUTO: 1.1 10*3/MM3 (ref 0.7–3.1)
LYMPHOCYTES NFR BLD AUTO: 15.4 % (ref 19.6–45.3)
MCH RBC QN AUTO: 30.4 PG (ref 26.6–33)
MCHC RBC AUTO-ENTMCNC: 35.5 G/DL (ref 31.5–35.7)
MCV RBC AUTO: 85.7 FL (ref 79–97)
MONOCYTES # BLD AUTO: 0.44 10*3/MM3 (ref 0.1–0.9)
MONOCYTES NFR BLD AUTO: 6.2 % (ref 5–12)
NEUTROPHILS NFR BLD AUTO: 5.55 10*3/MM3 (ref 1.7–7)
NEUTROPHILS NFR BLD AUTO: 77.7 % (ref 42.7–76)
NRBC BLD AUTO-RTO: 0 /100 WBC (ref 0–0.2)
PLATELET # BLD AUTO: 251 10*3/MM3 (ref 140–450)
PMV BLD AUTO: 9.6 FL (ref 6–12)
POTASSIUM SERPL-SCNC: 3.6 MMOL/L (ref 3.5–5.2)
PROT SERPL-MCNC: 7.4 G/DL (ref 6–8.5)
RBC # BLD AUTO: 4.34 10*6/MM3 (ref 3.77–5.28)
SODIUM SERPL-SCNC: 136 MMOL/L (ref 136–145)
WBC NRBC COR # BLD AUTO: 7.14 10*3/MM3 (ref 3.4–10.8)

## 2024-09-29 PROCEDURE — 80053 COMPREHEN METABOLIC PANEL: CPT | Performed by: PHYSICIAN ASSISTANT

## 2024-09-29 PROCEDURE — 25810000003 SODIUM CHLORIDE 0.9 % SOLUTION: Performed by: PHYSICIAN ASSISTANT

## 2024-09-29 PROCEDURE — 25010000002 ONDANSETRON PER 1 MG: Performed by: PHYSICIAN ASSISTANT

## 2024-09-29 PROCEDURE — 96374 THER/PROPH/DIAG INJ IV PUSH: CPT

## 2024-09-29 PROCEDURE — 99283 EMERGENCY DEPT VISIT LOW MDM: CPT

## 2024-09-29 PROCEDURE — 81001 URINALYSIS AUTO W/SCOPE: CPT | Performed by: PHYSICIAN ASSISTANT

## 2024-09-29 PROCEDURE — 85025 COMPLETE CBC W/AUTO DIFF WBC: CPT | Performed by: PHYSICIAN ASSISTANT

## 2024-09-29 RX ORDER — SODIUM CHLORIDE 0.9 % (FLUSH) 0.9 %
10 SYRINGE (ML) INJECTION AS NEEDED
Status: DISCONTINUED | OUTPATIENT
Start: 2024-09-29 | End: 2024-09-30 | Stop reason: HOSPADM

## 2024-09-29 RX ORDER — ONDANSETRON 2 MG/ML
4 INJECTION INTRAMUSCULAR; INTRAVENOUS ONCE
Status: COMPLETED | OUTPATIENT
Start: 2024-09-29 | End: 2024-09-29

## 2024-09-29 RX ADMIN — ONDANSETRON 4 MG: 2 INJECTION INTRAMUSCULAR; INTRAVENOUS at 22:52

## 2024-09-29 RX ADMIN — SODIUM CHLORIDE 1000 ML: 9 INJECTION, SOLUTION INTRAVENOUS at 22:52

## 2024-09-30 VITALS
OXYGEN SATURATION: 98 % | HEART RATE: 85 BPM | HEIGHT: 66 IN | WEIGHT: 135 LBS | BODY MASS INDEX: 21.69 KG/M2 | TEMPERATURE: 98.2 F | SYSTOLIC BLOOD PRESSURE: 100 MMHG | DIASTOLIC BLOOD PRESSURE: 59 MMHG | RESPIRATION RATE: 20 BRPM

## 2024-09-30 LAB
BACTERIA UR QL AUTO: ABNORMAL /HPF
BILIRUB UR QL STRIP: NEGATIVE
CLARITY UR: ABNORMAL
COLOR UR: YELLOW
GLUCOSE UR STRIP-MCNC: NEGATIVE MG/DL
HGB UR QL STRIP.AUTO: NEGATIVE
HYALINE CASTS UR QL AUTO: ABNORMAL /LPF
KETONES UR QL STRIP: ABNORMAL
LEUKOCYTE ESTERASE UR QL STRIP.AUTO: ABNORMAL
NITRITE UR QL STRIP: NEGATIVE
PH UR STRIP.AUTO: 5.5 [PH] (ref 5–8)
PROT UR QL STRIP: ABNORMAL
RBC # UR STRIP: ABNORMAL /HPF
REF LAB TEST METHOD: ABNORMAL
SP GR UR STRIP: 1.03 (ref 1–1.03)
SQUAMOUS #/AREA URNS HPF: ABNORMAL /HPF
UROBILINOGEN UR QL STRIP: ABNORMAL
WBC # UR STRIP: ABNORMAL /HPF

## 2024-09-30 PROCEDURE — 25810000003 SODIUM CHLORIDE 0.9 % SOLUTION: Performed by: PHYSICIAN ASSISTANT

## 2024-09-30 RX ADMIN — SODIUM CHLORIDE 1000 ML: 9 INJECTION, SOLUTION INTRAVENOUS at 00:05

## 2024-09-30 NOTE — DISCHARGE INSTRUCTIONS
Symptomatic care is recommended. Take all medications as prescribed and instructed. Follow up with OBGYN as directed or return to Emergency Department with worsening of symptoms.

## 2024-09-30 NOTE — ED PROVIDER NOTES
EMERGENCY DEPARTMENT ENCOUNTER    Pt Name: Brittny Hill  MRN: [id number]  Pt :   1995  Room Number:    Date of encounter:  2024  PCP: Provider, No Known  ED Provider: ANGELA Hyatt    Historian: Patient    HPI:  Chief Complaint: Nausea and vomiting in pregnancy     Context: Brittny Hill is a 29 y.o. female who presents to the ED c/o nausea and vomiting. Patient reports that she is 9 weeks pregnant and has been experiencing nausea and vomiting since 6 weeks of pregnancy. Patient reports that she follows with Dr. Alta PELAYO and has tried B-6, Doxylamine and most recently phenergan without improvement in symptoms. She reports she is unable to keep the medications down. She denies any additional complaints on exam. Similar symptoms with two prior pregnancies but doesn't remember it being this bad.   HPI     REVIEW OF SYSTEMS  A chief complaint appropriate review of systems was completed and is negative except as noted in the HPI.     PAST MEDICAL HISTORY  Past Medical History:   Diagnosis Date    History of hyperemesis gravidarum        PAST SURGICAL HISTORY  Past Surgical History:   Procedure Laterality Date    FOOT SURGERY      at age 13 on left foot       FAMILY HISTORY  Family History   Problem Relation Age of Onset    Hypertension Father        SOCIAL HISTORY  Social History     Socioeconomic History    Marital status:      Spouse name: Leyda     Number of children: 1    Highest education level: High school graduate   Tobacco Use    Smoking status: Never    Smokeless tobacco: Never   Vaping Use    Vaping status: Former    Devices: States that she only tried vaping for a short period of time   Substance and Sexual Activity    Alcohol use: Not Currently    Drug use: Never    Sexual activity: Yes     Partners: Male       ALLERGIES  Latex    PHYSICAL EXAM  Physical Exam  GENERAL:   Appears in no acute distress.  Nontoxic in appearance  HENT: Nares patent.  EYES: No scleral  icterus.  CV: Regular rhythm, regular rate.  RESPIRATORY: Normal effort.  No audible wheezes, rales or rhonchi.  ABDOMEN: Soft, nontender  MUSCULOSKELETAL: No deformities.   NEURO: Alert, moves all extremities, follows commands.  SKIN: Warm, dry, no rash visualized.    I have reviewed the triage vital signs and nursing notes.     LAB RESULTS  Results for orders placed or performed during the hospital encounter of 09/29/24   Comprehensive Metabolic Panel    Specimen: Blood   Result Value Ref Range    Glucose 92 65 - 99 mg/dL    BUN 7 6 - 20 mg/dL    Creatinine 0.47 (L) 0.57 - 1.00 mg/dL    Sodium 136 136 - 145 mmol/L    Potassium 3.6 3.5 - 5.2 mmol/L    Chloride 99 98 - 107 mmol/L    CO2 25.0 22.0 - 29.0 mmol/L    Calcium 9.1 8.6 - 10.5 mg/dL    Total Protein 7.4 6.0 - 8.5 g/dL    Albumin 4.6 3.5 - 5.2 g/dL    ALT (SGPT) 11 1 - 33 U/L    AST (SGOT) 17 1 - 32 U/L    Alkaline Phosphatase 43 39 - 117 U/L    Total Bilirubin 0.6 0.0 - 1.2 mg/dL    Globulin 2.8 gm/dL    A/G Ratio 1.6 g/dL    BUN/Creatinine Ratio 14.9 7.0 - 25.0    Anion Gap 12.0 5.0 - 15.0 mmol/L    eGFR 133.2 >60.0 mL/min/1.73   Urinalysis With Microscopic If Indicated (No Culture) - Urine, Clean Catch    Specimen: Urine, Clean Catch   Result Value Ref Range    Color, UA Yellow Yellow, Straw    Appearance, UA Cloudy (A) Clear    pH, UA 5.5 5.0 - 8.0    Specific Gravity, UA 1.029 1.001 - 1.030    Glucose, UA Negative Negative    Ketones, UA >=160 mg/dL (4+) (A) Negative    Bilirubin, UA Negative Negative    Blood, UA Negative Negative    Protein, UA 30 mg/dL (1+) (A) Negative    Leuk Esterase, UA Trace (A) Negative    Nitrite, UA Negative Negative    Urobilinogen, UA 1.0 E.U./dL 0.2 - 1.0 E.U./dL   CBC Auto Differential    Specimen: Blood   Result Value Ref Range    WBC 7.14 3.40 - 10.80 10*3/mm3    RBC 4.34 3.77 - 5.28 10*6/mm3    Hemoglobin 13.2 12.0 - 15.9 g/dL    Hematocrit 37.2 34.0 - 46.6 %    MCV 85.7 79.0 - 97.0 fL    MCH 30.4 26.6 - 33.0 pg     MCHC 35.5 31.5 - 35.7 g/dL    RDW 11.6 (L) 12.3 - 15.4 %    RDW-SD 36.4 (L) 37.0 - 54.0 fl    MPV 9.6 6.0 - 12.0 fL    Platelets 251 140 - 450 10*3/mm3    Neutrophil % 77.7 (H) 42.7 - 76.0 %    Lymphocyte % 15.4 (L) 19.6 - 45.3 %    Monocyte % 6.2 5.0 - 12.0 %    Eosinophil % 0.3 0.3 - 6.2 %    Basophil % 0.1 0.0 - 1.5 %    Immature Grans % 0.3 0.0 - 0.5 %    Neutrophils, Absolute 5.55 1.70 - 7.00 10*3/mm3    Lymphocytes, Absolute 1.10 0.70 - 3.10 10*3/mm3    Monocytes, Absolute 0.44 0.10 - 0.90 10*3/mm3    Eosinophils, Absolute 0.02 0.00 - 0.40 10*3/mm3    Basophils, Absolute 0.01 0.00 - 0.20 10*3/mm3    Immature Grans, Absolute 0.02 0.00 - 0.05 10*3/mm3    nRBC 0.0 0.0 - 0.2 /100 WBC   Urinalysis, Microscopic Only - Urine, Clean Catch    Specimen: Urine, Clean Catch   Result Value Ref Range    RBC, UA 0-2 None Seen, 0-2 /HPF    WBC, UA 6-10 (A) None Seen, 0-2 /HPF    Bacteria, UA 1+ (A) None Seen, Trace /HPF    Squamous Epithelial Cells, UA 7-12 (A) None Seen, 0-2 /HPF    Hyaline Casts, UA 7-12 0 - 6 /LPF    Methodology Automated Microscopy        If labs were ordered, I independently reviewed the results and considered them in treating the patient.    RADIOLOGY  No orders to display     [] Radiologist's Report Reviewed:  I ordered and independently interpreted the above noted radiographic studies.  See radiologist's dictation for official interpretation.      PROCEDURES    Procedures    No orders to display       MEDICATIONS GIVEN IN ER    Medications   sodium chloride 0.9 % flush 10 mL (has no administration in time range)   sodium chloride 0.9 % bolus 1,000 mL (0 mL Intravenous Stopped 9/30/24 0209)   ondansetron (ZOFRAN) injection 4 mg (4 mg Intravenous Given 9/29/24 8634)   sodium chloride 0.9 % bolus 1,000 mL (0 mL Intravenous Stopped 9/30/24 0210)       MEDICAL DECISION MAKING, PROGRESS, and CONSULTS   Medical Decision Making  This is a nontoxic-appearing 29-year-old female presents ED for evaluation of  nausea and vomiting in pregnancy.  Patient 9 weeks pregnant and has been experiencing symptoms since her sixth week of pregnancy.  No acute emergent findings on physical exam.  Labs without acute or emergent abnormalities.  Patient received supportive care with IV fluids and antiemetics in the ED.  Able to tolerate p.o. challenge prior to discharge.  Instructed on continued symptomatic care and outpatient follow-up to OB/GYN.  Return precautions provided.    Problems Addressed:  Nausea and vomiting in pregnancy prior to 22 weeks gestation: complicated acute illness or injury    Amount and/or Complexity of Data Reviewed  Independent Historian: spouse  Labs: ordered. Decision-making details documented in ED Course.    Risk  Prescription drug management.      Discussion below represents my analysis of pertinent findings related to patient's condition, differential diagnosis, treatment plan and final disposition.    Differential diagnosis:  The differential diagnosis associated with the patient's presentation includes: Gastroenteritis, dehydration, viral illness, hyperemesis gravidarum    Additional sources  Discussed/ obtained information from independent historians:   [x] Spouse  [] Parent  [] Family member  [] Friend  [] EMS   [] Other:  External (non-ED) record review:   [] Inpatient record:   [] Office record:   [] Outpatient record:   [] Prior Outpatient labs:   [] Prior Outpatient radiology:   [] Primary Care record:   [] Outside ED record:   [] Other:   Patient's care impacted by:   [] Diabetes  [] Hypertension  [] Hyperlipidemia  [] Hypothyroidism   [] Coronary Artery Disease   [] COPD   [] Cancer   [] Obesity  [] GERD   [] Tobacco Abuse   [] Substance Abuse    [] Anxiety   [] Depression   [] Other:   Care significantly affected by Social Determinants of Health (housing and economic circumstances, unemployment)    [] Yes     [x] No   If yes, Patient's care significantly limited by  Social Determinants of Health  including:   [] Inadequate housing   [] Low income   [] Alcoholism and drug addiction in family   [] Problems related to primary support group   [] Unemployment   [] Problems related to employment   [] Other Social Determinants of Health:     Orders placed during this visit:  Orders Placed This Encounter   Procedures    Comprehensive Metabolic Panel    Urinalysis With Microscopic If Indicated (No Culture) - Urine, Clean Catch    CBC Auto Differential    Urinalysis, Microscopic Only - Urine, Clean Catch    Insert Peripheral IV    CBC & Differential     I considered prescription management  with:   [] Pain medication  [] Antiviral  [x] Antibiotic: Patient already prescribed antibiotics from her OB/GYN   [] Other:   Rationale:    ED Course:    ED Course as of 09/30/24 0216   Sun Sep 29, 2024   2231 Vitals and Telemetry tracing was reviewed and directly interpreted by myself demonstrating blood pressure 116/82, afebrile, heart rate 90/bpm, respiration rate 20 breaths/min and oxygen saturation 100% on room air  [JG]   2231 BP: 116/82 [JG]   2231 Temp: 98.2 °F (36.8 °C) [JG]   2231 Heart Rate: 90 [JG]   2231 Resp: 20 [JG]   2231 SpO2: 100 % [JG]   2357 On reassessment at bedside patient resting in position of comfort in no acute distress.  [JG]   Mon Sep 30, 2024   0031 Patient requesting ginger ale; will attempt PO challenge [JG]   0214 Labs studies were reviewed and directly interpreted by myself and demonstrated no acute or emergent abnormalities.   [JG]      ED Course User Index  [JG] Brian Leonardo PA          DIAGNOSIS  Final diagnoses:   Nausea and vomiting in pregnancy prior to 22 weeks gestation     DISPOSITION    ED Disposition       ED Disposition   Discharge    Condition   Stable    Comment   --           DISCHARGE    Patient discharged in stable condition.    Reviewed implications of results, diagnosis, meds, responsibility to follow up, warning signs and symptoms of possible worsening, potential  complications and reasons to return to ER.    Patient/Family voiced understanding of above instructions.    Discussed plan for discharge, as there is no emergent indication for admission.  Pt/family is agreeable and understands need for follow up and possible repeat testing.  Pt/family is aware that discharge does not mean that nothing is wrong but that it indicates no emergency is currently present that requires admission and they must continue care with follow-up as given below or with a physician of their choice.     FOLLOW-UP  Brandon Holm MD  [address] [facility] [address]  [facility] [id number] [phone]    Call   Call for follow up with [facility] EMERGENCY DEPARTMENT  [address] [facility] [address] [phone]  Go to   If symptoms worsen         Medication List      No changes were made to your prescriptions during this visit.          Please note that portions of this document were completed with voice recognition software.        Brian Leonardo PA  09/30/24 0216

## 2024-10-01 ENCOUNTER — HOSPITAL ENCOUNTER (EMERGENCY)
Facility: HOSPITAL | Age: 29
Discharge: HOME | End: 2024-10-02
Attending: EMERGENCY MEDICINE
Payer: MEDICAID

## 2024-10-01 DIAGNOSIS — R42 POSTURAL DIZZINESS: ICD-10-CM

## 2024-10-01 DIAGNOSIS — R11.2 NAUSEA AND VOMITING, UNSPECIFIED VOMITING TYPE: ICD-10-CM

## 2024-10-01 DIAGNOSIS — Z3A.09 9 WEEKS GESTATION OF PREGNANCY: ICD-10-CM

## 2024-10-01 DIAGNOSIS — O21.0 HYPEREMESIS GRAVIDARUM: Primary | ICD-10-CM

## 2024-10-01 PROCEDURE — 99283 EMERGENCY DEPT VISIT LOW MDM: CPT

## 2024-10-01 RX ORDER — SODIUM CHLORIDE 0.9 % (FLUSH) 0.9 %
10 SYRINGE (ML) INJECTION AS NEEDED
Status: DISCONTINUED | OUTPATIENT
Start: 2024-10-01 | End: 2024-10-02 | Stop reason: HOSPADM

## 2024-10-01 RX ORDER — METOCLOPRAMIDE HYDROCHLORIDE 5 MG/ML
10 INJECTION INTRAMUSCULAR; INTRAVENOUS ONCE
Status: COMPLETED | OUTPATIENT
Start: 2024-10-01 | End: 2024-10-02

## 2024-10-02 VITALS
SYSTOLIC BLOOD PRESSURE: 101 MMHG | OXYGEN SATURATION: 98 % | RESPIRATION RATE: 18 BRPM | DIASTOLIC BLOOD PRESSURE: 58 MMHG | TEMPERATURE: 98.6 F | WEIGHT: 135 LBS | HEIGHT: 66 IN | HEART RATE: 81 BPM | BODY MASS INDEX: 21.69 KG/M2

## 2024-10-02 LAB
ALBUMIN SERPL-MCNC: 4.2 G/DL (ref 3.5–5.2)
ALBUMIN/GLOB SERPL: 1.8 G/DL
ALP SERPL-CCNC: 42 U/L (ref 39–117)
ALT SERPL W P-5'-P-CCNC: 10 U/L (ref 1–33)
ANION GAP SERPL CALCULATED.3IONS-SCNC: 9 MMOL/L (ref 5–15)
AST SERPL-CCNC: 14 U/L (ref 1–32)
BACTERIA UR QL AUTO: ABNORMAL /HPF
BASOPHILS # BLD AUTO: 0.02 10*3/MM3 (ref 0–0.2)
BASOPHILS NFR BLD AUTO: 0.3 % (ref 0–1.5)
BILIRUB SERPL-MCNC: 0.3 MG/DL (ref 0–1.2)
BILIRUB UR QL STRIP: NEGATIVE
BUN SERPL-MCNC: 6 MG/DL (ref 6–20)
BUN/CREAT SERPL: 12.5 (ref 7–25)
CALCIUM SPEC-SCNC: 8.9 MG/DL (ref 8.6–10.5)
CHLORIDE SERPL-SCNC: 104 MMOL/L (ref 98–107)
CLARITY UR: ABNORMAL
CO2 SERPL-SCNC: 25 MMOL/L (ref 22–29)
COLOR UR: YELLOW
CREAT SERPL-MCNC: 0.48 MG/DL (ref 0.57–1)
DEPRECATED RDW RBC AUTO: 37.1 FL (ref 37–54)
EGFRCR SERPLBLD CKD-EPI 2021: 132.5 ML/MIN/1.73
EOSINOPHIL # BLD AUTO: 0.01 10*3/MM3 (ref 0–0.4)
EOSINOPHIL NFR BLD AUTO: 0.2 % (ref 0.3–6.2)
ERYTHROCYTE [DISTWIDTH] IN BLOOD BY AUTOMATED COUNT: 11.7 % (ref 12.3–15.4)
GLOBULIN UR ELPH-MCNC: 2.3 GM/DL
GLUCOSE SERPL-MCNC: 92 MG/DL (ref 65–99)
GLUCOSE UR STRIP-MCNC: NEGATIVE MG/DL
HCG INTACT+B SERPL-ACNC: NORMAL MIU/ML
HCT VFR BLD AUTO: 34 % (ref 34–46.6)
HGB BLD-MCNC: 11.9 G/DL (ref 12–15.9)
HGB UR QL STRIP.AUTO: NEGATIVE
HYALINE CASTS UR QL AUTO: ABNORMAL /LPF
IMM GRANULOCYTES # BLD AUTO: 0.01 10*3/MM3 (ref 0–0.05)
IMM GRANULOCYTES NFR BLD AUTO: 0.2 % (ref 0–0.5)
KETONES UR QL STRIP: ABNORMAL
LEUKOCYTE ESTERASE UR QL STRIP.AUTO: ABNORMAL
LYMPHOCYTES # BLD AUTO: 1.49 10*3/MM3 (ref 0.7–3.1)
LYMPHOCYTES NFR BLD AUTO: 23.4 % (ref 19.6–45.3)
MAGNESIUM SERPL-MCNC: 1.9 MG/DL (ref 1.6–2.6)
MCH RBC QN AUTO: 30.4 PG (ref 26.6–33)
MCHC RBC AUTO-ENTMCNC: 35 G/DL (ref 31.5–35.7)
MCV RBC AUTO: 87 FL (ref 79–97)
MONOCYTES # BLD AUTO: 0.49 10*3/MM3 (ref 0.1–0.9)
MONOCYTES NFR BLD AUTO: 7.7 % (ref 5–12)
NEUTROPHILS NFR BLD AUTO: 4.35 10*3/MM3 (ref 1.7–7)
NEUTROPHILS NFR BLD AUTO: 68.2 % (ref 42.7–76)
NITRITE UR QL STRIP: NEGATIVE
NRBC BLD AUTO-RTO: 0 /100 WBC (ref 0–0.2)
PH UR STRIP.AUTO: 6 [PH] (ref 5–8)
PLATELET # BLD AUTO: 235 10*3/MM3 (ref 140–450)
PMV BLD AUTO: 10 FL (ref 6–12)
POTASSIUM SERPL-SCNC: 3.6 MMOL/L (ref 3.5–5.2)
PROT SERPL-MCNC: 6.5 G/DL (ref 6–8.5)
PROT UR QL STRIP: ABNORMAL
RBC # BLD AUTO: 3.91 10*6/MM3 (ref 3.77–5.28)
RBC # UR STRIP: ABNORMAL /HPF
REF LAB TEST METHOD: ABNORMAL
SODIUM SERPL-SCNC: 138 MMOL/L (ref 136–145)
SP GR UR STRIP: >=1.03 (ref 1–1.03)
SQUAMOUS #/AREA URNS HPF: ABNORMAL /HPF
UROBILINOGEN UR QL STRIP: ABNORMAL
WBC # UR STRIP: ABNORMAL /HPF
WBC NRBC COR # BLD AUTO: 6.37 10*3/MM3 (ref 3.4–10.8)

## 2024-10-02 PROCEDURE — 25810000003 SODIUM CHLORIDE 0.9 % SOLUTION: Performed by: PHYSICIAN ASSISTANT

## 2024-10-02 PROCEDURE — 80053 COMPREHEN METABOLIC PANEL: CPT | Performed by: PHYSICIAN ASSISTANT

## 2024-10-02 PROCEDURE — 85025 COMPLETE CBC W/AUTO DIFF WBC: CPT | Performed by: PHYSICIAN ASSISTANT

## 2024-10-02 PROCEDURE — 81001 URINALYSIS AUTO W/SCOPE: CPT | Performed by: PHYSICIAN ASSISTANT

## 2024-10-02 PROCEDURE — 83735 ASSAY OF MAGNESIUM: CPT | Performed by: PHYSICIAN ASSISTANT

## 2024-10-02 PROCEDURE — 25010000002 METOCLOPRAMIDE PER 10 MG: Performed by: PHYSICIAN ASSISTANT

## 2024-10-02 PROCEDURE — 84702 CHORIONIC GONADOTROPIN TEST: CPT | Performed by: PHYSICIAN ASSISTANT

## 2024-10-02 PROCEDURE — 96374 THER/PROPH/DIAG INJ IV PUSH: CPT

## 2024-10-02 PROCEDURE — 96361 HYDRATE IV INFUSION ADD-ON: CPT

## 2024-10-02 RX ORDER — PROMETHAZINE HYDROCHLORIDE 25 MG/1
25 SUPPOSITORY RECTAL EVERY 6 HOURS PRN
Qty: 10 SUPPOSITORY | Refills: 0 | Status: SHIPPED | OUTPATIENT
Start: 2024-10-02

## 2024-10-02 RX ORDER — METOCLOPRAMIDE 10 MG/1
10 TABLET ORAL
Qty: 30 TABLET | Refills: 0 | Status: SHIPPED | OUTPATIENT
Start: 2024-10-02

## 2024-10-02 RX ADMIN — METOCLOPRAMIDE 10 MG: 5 INJECTION, SOLUTION INTRAMUSCULAR; INTRAVENOUS at 00:22

## 2024-10-02 RX ADMIN — SODIUM CHLORIDE 2000 ML: 9 INJECTION, SOLUTION INTRAVENOUS at 00:21

## 2024-10-02 NOTE — ED PROVIDER NOTES
Subjective   History of Present Illness  This is a 29-year-old female that presents to the ER with increased nausea/vomiting in first trimester of pregnancy.  Patient is 9 weeks and 5 days pregnant and has been diagnosed with hyperemesis gravidarum.  She was just seen in our ER on 2024 for the same symptoms.  After reviewing that ER record, patient has been experiencing nausea with vomiting since 6 weeks gestation.  She is following with Dr. Holm as her routine OB/GYN.  Patient has tried vitamin B6 Doxylamine, and most recently tried promethazine without any improvement of symptoms.  Patient tells us today that she is unable to keep any p.o. and fluids down.  She denies any complaints of abdominal pain or irregular vaginal discharge or vaginal bleeding.  Patient reports vomiting 3 times today.  She has been unable to keep any p.o. or fluids down.  She reports intermittent dizziness with standing but denies any syncope.  She also reports that she urinated 3 times today, but it was concentrated.  She is  3 para 2 miscarriage 0.  She has had increased nausea/vomiting with pregnancy in the past, but never as severe as this pregnancy.    History provided by:  Patient  Pregnancy Problem  The current episode started more than 2 days ago (Patient has been having nausea/vomiting since 6 weeks gestation.  Diagnosis has been made of hyperemesis gravidarum). Gestational age is 9 weeks. Patient reports no abdominal pain, no vaginal bleeding and no vaginal discharge. Prenatal care has been regular (Patient follows with Dr. Holm as repeat OB/GYN).   Associated symptoms include nausea, vomiting (Emesis x 3 today) and weakness.   Associated symptoms include no dysuria and no fever.       Review of Systems   Constitutional:  Positive for appetite change and fatigue. Negative for chills and fever.   HENT: Negative.  Negative for congestion, ear pain, postnasal drip, rhinorrhea, sinus pressure, sinus pain and sore  throat.    Respiratory: Negative.     Cardiovascular: Negative.    Gastrointestinal:  Positive for diarrhea (Patient had 1 loose stool this evening), nausea and vomiting (Emesis x 3 today). Negative for abdominal pain and constipation.   Genitourinary:  Positive for decreased urine volume. Negative for dysuria, flank pain, frequency, pelvic pain, vaginal bleeding and vaginal discharge.         3 para 2 miscarriage 0.  Patient following with Dr. Holm, routine OB/GYN.  Patient is 9 weeks 5 days pregnant.  She reports hyperemesis gravidarum diagnosed with this pregnancy.   Musculoskeletal: Negative.  Negative for myalgias.   Neurological:  Positive for dizziness (Intermittent postural dizziness without syncopal episode.  At times, patient describes near syncope.) and weakness. Negative for syncope.   All other systems reviewed and are negative.      Past Medical History:   Diagnosis Date    History of hyperemesis gravidarum        Allergies   Allergen Reactions    Latex Rash       Past Surgical History:   Procedure Laterality Date    FOOT SURGERY      at age 13 on left foot       Family History   Problem Relation Age of Onset    Hypertension Father        Social History     Socioeconomic History    Marital status:      Spouse name: Leyda     Number of children: 1    Highest education level: High school graduate   Tobacco Use    Smoking status: Never    Smokeless tobacco: Never   Vaping Use    Vaping status: Former    Devices: States that she only tried vaping for a short period of time   Substance and Sexual Activity    Alcohol use: Not Currently    Drug use: Never    Sexual activity: Yes     Partners: Male           Objective   Physical Exam  Vitals and nursing note reviewed.   Constitutional:       General: She is not in acute distress.     Appearance: Normal appearance. She is not ill-appearing, toxic-appearing or diaphoretic.      Comments: No acute sign of pain or distress.  Nontoxic.   HENT:       Head: Normocephalic and atraumatic.      Nose: Nose normal.      Mouth/Throat:      Mouth: Mucous membranes are moist.      Comments: Oral mucous membranes are still moist  Eyes:      Extraocular Movements: Extraocular movements intact.      Conjunctiva/sclera: Conjunctivae normal.      Pupils: Pupils are equal, round, and reactive to light.   Cardiovascular:      Rate and Rhythm: Normal rate and regular rhythm. No extrasystoles are present.     Pulses: Normal pulses.      Heart sounds: Normal heart sounds.      Comments: Regular rate and rhythm.  No tachycardia or ectopy.  No pedal edema to lower extremities  Pulmonary:      Effort: Pulmonary effort is normal.      Breath sounds: Normal breath sounds.      Comments: Lungs are clear to auscultation bilaterally  Abdominal:      General: Bowel sounds are normal. There is no distension.      Palpations: Abdomen is soft.      Tenderness: There is no abdominal tenderness. There is no right CVA tenderness, left CVA tenderness, guarding or rebound.      Comments: Abdomen soft without distention.  Active bowel sounds all 4 quadrants.  Nontender to palpation   Musculoskeletal:         General: Normal range of motion.      Cervical back: Normal range of motion and neck supple.      Right lower leg: No edema.      Left lower leg: No edema.   Skin:     General: Skin is warm and dry.      Coloration: Skin is not pale.      Comments: No pallor noted   Neurological:      General: No focal deficit present.      Mental Status: She is alert and oriented to person, place, and time.      Cranial Nerves: Cranial nerves 2-12 are intact.      Sensory: Sensation is intact.      Motor: Motor function is intact.      Coordination: Coordination is intact.      Comments: Alert and oriented x 3.  Neuro intact and nonfocal.         Procedures           ED Course  ED Course as of 10/02/24 0310   Wed Oct 02, 2024   0140 Vital signs are stable.  CBC and chemistries were essentially normal.   Magnesium is 1.9.  Quant hCG is 185,338, which is consistent with around 9 to 10 weeks gestation.  Urinalysis reveals 15 mg/dL of ketones, 3-5 white blood cells, no bacteria, and negative nitrite.  Patient given aggressive IV fluids with 2 L of normal saline and IV Reglan.  We will continue to closely observe patient and reassess her after IV fluids RN. [FC]   0305 Upon reassessment, IV fluids are in and patient feels markedly improved.  She said the Reglan did significantly improve her nausea.  We will prescribe Reglan 10 mg by mouth 4 times daily before meals and at bedtime as needed for nausea and also will prescribe promethazine suppositories  Patient needs to follow-up routinely with Dr. Holm.  Return to the ER if worsening symptoms. [FC]      ED Course User Index  [FC] Tracy Carter, DARLENE                                        Recent Results (from the past 24 hour(s))   Comprehensive Metabolic Panel    Collection Time: 10/02/24 12:01 AM    Specimen: Blood   Result Value Ref Range    Glucose 92 65 - 99 mg/dL    BUN 6 6 - 20 mg/dL    Creatinine 0.48 (L) 0.57 - 1.00 mg/dL    Sodium 138 136 - 145 mmol/L    Potassium 3.6 3.5 - 5.2 mmol/L    Chloride 104 98 - 107 mmol/L    CO2 25.0 22.0 - 29.0 mmol/L    Calcium 8.9 8.6 - 10.5 mg/dL    Total Protein 6.5 6.0 - 8.5 g/dL    Albumin 4.2 3.5 - 5.2 g/dL    ALT (SGPT) 10 1 - 33 U/L    AST (SGOT) 14 1 - 32 U/L    Alkaline Phosphatase 42 39 - 117 U/L    Total Bilirubin 0.3 0.0 - 1.2 mg/dL    Globulin 2.3 gm/dL    A/G Ratio 1.8 g/dL    BUN/Creatinine Ratio 12.5 7.0 - 25.0    Anion Gap 9.0 5.0 - 15.0 mmol/L    eGFR 132.5 >60.0 mL/min/1.73   hCG, Quantitative, Pregnancy    Collection Time: 10/02/24 12:01 AM    Specimen: Blood   Result Value Ref Range    HCG Quantitative 185,338.00 mIU/mL   Magnesium    Collection Time: 10/02/24 12:01 AM    Specimen: Blood   Result Value Ref Range    Magnesium 1.9 1.6 - 2.6 mg/dL   CBC Auto Differential    Collection Time: 10/02/24 12:01 AM     Specimen: Blood   Result Value Ref Range    WBC 6.37 3.40 - 10.80 10*3/mm3    RBC 3.91 3.77 - 5.28 10*6/mm3    Hemoglobin 11.9 (L) 12.0 - 15.9 g/dL    Hematocrit 34.0 34.0 - 46.6 %    MCV 87.0 79.0 - 97.0 fL    MCH 30.4 26.6 - 33.0 pg    MCHC 35.0 31.5 - 35.7 g/dL    RDW 11.7 (L) 12.3 - 15.4 %    RDW-SD 37.1 37.0 - 54.0 fl    MPV 10.0 6.0 - 12.0 fL    Platelets 235 140 - 450 10*3/mm3    Neutrophil % 68.2 42.7 - 76.0 %    Lymphocyte % 23.4 19.6 - 45.3 %    Monocyte % 7.7 5.0 - 12.0 %    Eosinophil % 0.2 (L) 0.3 - 6.2 %    Basophil % 0.3 0.0 - 1.5 %    Immature Grans % 0.2 0.0 - 0.5 %    Neutrophils, Absolute 4.35 1.70 - 7.00 10*3/mm3    Lymphocytes, Absolute 1.49 0.70 - 3.10 10*3/mm3    Monocytes, Absolute 0.49 0.10 - 0.90 10*3/mm3    Eosinophils, Absolute 0.01 0.00 - 0.40 10*3/mm3    Basophils, Absolute 0.02 0.00 - 0.20 10*3/mm3    Immature Grans, Absolute 0.01 0.00 - 0.05 10*3/mm3    nRBC 0.0 0.0 - 0.2 /100 WBC   Urinalysis With Microscopic If Indicated (No Culture) - Urine, Clean Catch    Collection Time: 10/02/24 12:20 AM    Specimen: Urine, Clean Catch   Result Value Ref Range    Color, UA Yellow Yellow, Straw    Appearance, UA Cloudy (A) Clear    pH, UA 6.0 5.0 - 8.0    Specific Gravity, UA >=1.030 1.001 - 1.030    Glucose, UA Negative Negative    Ketones, UA 15 mg/dL (1+) (A) Negative    Bilirubin, UA Negative Negative    Blood, UA Negative Negative    Protein, UA 30 mg/dL (1+) (A) Negative    Leuk Esterase, UA Trace (A) Negative    Nitrite, UA Negative Negative    Urobilinogen, UA 1.0 E.U./dL 0.2 - 1.0 E.U./dL   Urinalysis, Microscopic Only - Urine, Clean Catch    Collection Time: 10/02/24 12:20 AM    Specimen: Urine, Clean Catch   Result Value Ref Range    RBC, UA 0-2 None Seen, 0-2 /HPF    WBC, UA 3-5 (A) None Seen, 0-2 /HPF    Bacteria, UA None Seen None Seen, Trace /HPF    Squamous Epithelial Cells, UA 7-12 (A) None Seen, 0-2 /HPF    Hyaline Casts, UA 7-12 0 - 6 /LPF    Methodology Automated Microscopy       Note: In addition to lab results from this visit, the labs listed above may include labs taken at another facility or during a different encounter within the last 24 hours. Please correlate lab times with ED admission and discharge times for further clarification of the services performed during this visit.    No orders to display     Vitals:    10/02/24 0206 10/02/24 0211 10/02/24 0216 10/02/24 0221   BP:       BP Location:       Patient Position:       Pulse: 90 74 75 81   Resp:       Temp:       TempSrc:       SpO2: 96% 97% 97% 98%   Weight:       Height:         Medications   sodium chloride 0.9 % flush 10 mL (has no administration in time range)   sodium chloride 0.9 % bolus 2,000 mL (0 mL Intravenous Stopped 10/2/24 0226)   metoclopramide (REGLAN) injection 10 mg (10 mg Intravenous Given 10/2/24 0022)     ECG/EMG Results (last 24 hours)       ** No results found for the last 24 hours. **          No orders to display              Medical Decision Making  Amount and/or Complexity of Data Reviewed  Labs: ordered.    Risk  Prescription drug management.        Final diagnoses:   Hyperemesis gravidarum   Nausea and vomiting, unspecified vomiting type   9 weeks gestation of pregnancy   Postural dizziness       ED Disposition  ED Disposition       ED Disposition   Discharge    Condition   Stable    Comment   --               Brandon Holm MD  [address] [facility] [address]  [facility] [address]  [facility] [address]  [phone]    Call today  Call today for close recheck    [facility] EMERGENCY DEPARTMENT  [address] [facility] [id number] [phone]    If symptoms worsen         Medication List        New Prescriptions      metoclopramide 10 MG tablet  Commonly known as: REGLAN  Take 1 tablet by mouth 4 (Four) Times a Day Before Meals & at Bedtime As Needed (nausea/vomiting).            Changed      * promethazine 25 MG tablet  Commonly known as: PHENERGAN  Take 1 tablet by mouth Every 6  (Six) Hours As Needed for Nausea or Vomiting.  What changed: Another medication with the same name was added. Make sure you understand how and when to take each.     * promethazine 25 MG suppository  Commonly known as: PHENERGAN  Insert 1 suppository into the rectum Every 6 (Six) Hours As Needed for Nausea or Vomiting.  What changed: You were already taking a medication with the same name, and this prescription was added. Make sure you understand how and when to take each.           * This list has 2 medication(s) that are the same as other medications prescribed for you. Read the directions carefully, and ask your doctor or other care provider to review them with you.                   Where to Get Your Medications        These medications were sent to [id number] - [place], [place] - [address] AT [facility] [address] - [phone]  - [phone]   [address] [place] [address]      Phone: [phone]   metoclopramide 10 MG tablet  promethazine 25 MG suppository            Tracy Carter PA-C  10/02/24 0315

## 2024-10-02 NOTE — DISCHARGE INSTRUCTIONS
Vital signs and physical exam are stable.  CBC and chemistries, including electrolytes were all within normal limits.  Urinalysis reveals trace ketones which indicates a mild volume depletion but there was no acute urinary tract infection.  We gave IV fluids and medications to help with nausea.  Will prescribe Reglan 10 mg by mouth 4 times daily, before meals and at bedtime for nausea/vomiting related to pregnancy.  We also will prescribe promethazine suppositories every 6 hours as needed for nausea/vomiting.  Encourage fluids and follow-up closely with routine OB/GYN, Dr. Holm.  Return to the ER if worsening symptoms.

## 2024-10-04 ENCOUNTER — ROUTINE PRENATAL (OUTPATIENT)
Dept: OBSTETRICS AND GYNECOLOGY | Facility: CLINIC | Age: 29
End: 2024-10-04
Payer: MEDICAID

## 2024-10-04 VITALS — BODY MASS INDEX: 22.05 KG/M2 | DIASTOLIC BLOOD PRESSURE: 66 MMHG | SYSTOLIC BLOOD PRESSURE: 100 MMHG | WEIGHT: 136.6 LBS

## 2024-10-04 DIAGNOSIS — Z34.90 PREGNANCY, UNSPECIFIED GESTATIONAL AGE: Primary | ICD-10-CM

## 2024-10-04 DIAGNOSIS — O21.9 NAUSEA/VOMITING IN PREGNANCY: ICD-10-CM

## 2024-10-04 DIAGNOSIS — Z34.90 PRENATAL CARE, ANTEPARTUM: ICD-10-CM

## 2024-10-04 LAB
GLUCOSE UR STRIP-MCNC: NEGATIVE MG/DL
PROT UR STRIP-MCNC: NEGATIVE MG/DL

## 2024-10-04 NOTE — PROGRESS NOTES
OB FOLLOW UP  CC- Here for care of pregnancy        Brittny Hill is a 29 y.o.  10w1d patient being seen today for her obstetrical follow up visit. Patient reports no complaints.     Patient went to ER on 24 and 10/1/24 for increased and excessive nausea and vomiting. Patient has tried Vit B6, Unisom and Phenergan. She was diagnosed with hyperemesis gravidarum. On , she was given IV fluids and tolerates PO challenge before discharge. She went back to  ER on 10/1 for same symptoms. She was given aggressive IV fluids with IV Reglan. She was sent home with prescription for Reglan 10 mg PO QID before meals and at bedtime PRN and  promethazine suppositories.    She reports the Reglan is helping her keep fluids and food down.     Her prenatal care is complicated by (and status) :   Patient Active Problem List   Diagnosis    Screening for cervical cancer    Retroverted uterus    General counseling and advice on contraceptive management    Dysmenorrhea    Pregnancy    Nausea/vomiting in pregnancy    Mother positive for group B Streptococcus colonization       Genetic testing?: desires with gender.  NOB labs reviewed  Flu Status: Declines  Ultrasound Today: No    ROS -   Patient Denies: leaking of fluid, vaginal bleeding, dysuria,  and more than 6 contractions per hour  All other systems reviewed and are negative.     The additional following portions of the patient's history were reviewed and updated as appropriate: allergies, current medications, past family history, past medical history, past social history, past surgical history, and problem list.    I have reviewed and agree with the HPI, ROS, and historical information as entered above. Shanique Cleary, APRN          /66   Wt 62 kg (136 lb 9.6 oz)   LMP 2024   BMI 22.05 kg/m²         EXAM:     Prenatal Vitals  BP: 100/66  Weight: 62 kg (136 lb 9.6 oz)   Fetal Heart Rate: Visualized (Visualized with mobile US.)           Urine Glucose Read-only: Negative  Urine Protein Read-only: Negative       Assessment and Plan    Problem List Items Addressed This Visit       Mother positive for group B Streptococcus colonization    Overview     Prenatal urine culture was positive for group B strep.  Rx for amoxicillin 500 mg 3 times daily for 7 days sent to pharmacy.  Will need group B strep prophylaxis in labor.         Nausea/vomiting in pregnancy    Overview     Improved on vitamin B6 6 and Unisom.  2024 Rx for Phenergan sent to pharmacy.    Managed with [id number] QID PRN         Pregnancy - Primary    Overview     29-year-old   Dates consistent with 8-week 5-day ultrasound.  Request cell free DNA screening.  Required progesterone supplementation with previous pregnancy  History of precipitous delivery.          Other Visit Diagnoses       Prenatal care, antepartum        Relevant Orders    POC Urinalysis Dipstick (Completed)    [id number] PLUS Core+SCA+ESS - Blood,            Pregnancy at 10w1d  Labs reviewed from New OB Visit.  Counseled on genetic testing, carrier status and option for NT screen. cfDNA pending.   Activity and Exercise discussed.  Continue Reglan as directed.   Patient is on Prenatal vitamins  Discussed/encouraged Flu vaccination  Discussed/encouraged social distancing/COVID19 precautions; encouraged vaccination when able  Return in about 4 weeks (around 2024) for DOMINGA randolph/[place].    Shanique Cleary, APRN  10/04/2024

## 2024-10-28 ENCOUNTER — ROUTINE PRENATAL (OUTPATIENT)
Dept: OBSTETRICS AND GYNECOLOGY | Facility: CLINIC | Age: 29
End: 2024-10-28
Payer: MEDICAID

## 2024-10-28 VITALS — SYSTOLIC BLOOD PRESSURE: 108 MMHG | WEIGHT: 141.4 LBS | DIASTOLIC BLOOD PRESSURE: 68 MMHG | BODY MASS INDEX: 22.82 KG/M2

## 2024-10-28 DIAGNOSIS — Z12.4 SCREENING FOR CERVICAL CANCER: ICD-10-CM

## 2024-10-28 DIAGNOSIS — N85.4 RETROVERTED UTERUS: ICD-10-CM

## 2024-10-28 DIAGNOSIS — Z34.90 PRENATAL CARE, ANTEPARTUM: Primary | ICD-10-CM

## 2024-10-28 DIAGNOSIS — Z34.90 PREGNANCY, UNSPECIFIED GESTATIONAL AGE: ICD-10-CM

## 2024-10-28 DIAGNOSIS — O21.9 NAUSEA/VOMITING IN PREGNANCY: ICD-10-CM

## 2024-10-28 PROBLEM — Z30.09 GENERAL COUNSELING AND ADVICE ON CONTRACEPTIVE MANAGEMENT: Status: RESOLVED | Noted: 2021-10-25 | Resolved: 2024-10-28

## 2024-10-28 PROBLEM — N94.6 DYSMENORRHEA: Status: RESOLVED | Noted: 2024-03-29 | Resolved: 2024-10-28

## 2024-10-28 LAB
GLUCOSE UR STRIP-MCNC: NEGATIVE MG/DL
PROT UR STRIP-MCNC: NEGATIVE MG/DL

## 2024-10-28 PROCEDURE — 99213 OFFICE O/P EST LOW 20 MIN: CPT | Performed by: OBSTETRICS & GYNECOLOGY

## 2024-10-28 NOTE — PROGRESS NOTES
OB FOLLOW UP  CC- Here for care of pregnancy        Brittny Hill is a 29 y.o.  13w4d patient being seen today for her obstetrical follow up visit. Patient reports that she tested + for a yeast infection at [facility] in [place]. She states that she was given a rx for cream that she is going to  today.     Her prenatal care is complicated by (and status) :   Patient Active Problem List   Diagnosis    Screening for cervical cancer    Retroverted uterus    Pregnancy    Nausea/vomiting in pregnancy    Mother positive for group B Streptococcus colonization       Genetic testing?: already completed and was normal.  NOB labs reviewed  Flu Status: Declines  Ultrasound Today: No    ROS -   Patient Denies: leaking of fluid, vaginal bleeding, dysuria, excessive vomiting, and more than 6 contractions per hour  All other systems reviewed and are negative.     The additional following portions of the patient's history were reviewed and updated as appropriate: allergies, current medications, past family history, past medical history, past social history, past surgical history, and problem list.    I have reviewed and agree with the HPI, ROS, and historical information as entered above.   Brandon Holm MD          /68   Wt 64.1 kg (141 lb 6.4 oz)   LMP 2024   BMI 22.82 kg/m²         EXAM:     Prenatal Vitals  BP: 108/68  Weight: 64.1 kg (141 lb 6.4 oz)   Fetal Heart Rate: 133          Urine Glucose Read-only: Negative  Urine Protein Read-only: Negative       Assessment and Plan    Problem List Items Addressed This Visit          Gynecologic and Obstetric Problems    Mother positive for group B Streptococcus colonization    Overview     Prenatal urine culture was positive for group B strep.  Rx for amoxicillin 500 mg 3 times daily for 7 days sent to pharmacy.  Will need group B strep prophylaxis in labor.         Nausea/vomiting in pregnancy    Overview     Improved on vitamin  B6 6 and Unisom.  2024 Rx for Phenergan sent to pharmacy.    Managed with [id number] QID PRN         Retroverted uterus       Other    Pregnancy    Overview     29-year-old   Dates consistent with 8-week 5-day ultrasound.  cfDNA- Negative, Male  Required progesterone supplementation with previous pregnancy  History of precipitous delivery.         Screening for cervical cancer    Overview     Pap normal 2021  Pap smear 2022 was negative. HPV high risk pool negative.          Other Visit Diagnoses       Prenatal care, antepartum    -  Primary    Relevant Orders    POC Urinalysis Dipstick (Completed)            Pregnancy at 13w4d; free DNA screen was low risk.  Gender given to patient in envelope for later gender reveal.   Labs reviewed from New OB Visit.  aFP screen next visit discussed.  Counseled on genetic testing, carrier status and option for NT screen.  Nausea and vomiting improving.  No longer taking Reglan or Phenergan.  Activity and Exercise discussed.  Patient is on Prenatal vitamins  Return in about 4 weeks (around 2024) for Next scheduled follow up.    Brandon Holm MD  10/28/2024

## 2024-12-04 ENCOUNTER — ROUTINE PRENATAL (OUTPATIENT)
Dept: OBSTETRICS AND GYNECOLOGY | Facility: CLINIC | Age: 29
End: 2024-12-04
Payer: MEDICAID

## 2024-12-04 VITALS — BODY MASS INDEX: 24.37 KG/M2 | WEIGHT: 151 LBS | SYSTOLIC BLOOD PRESSURE: 110 MMHG | DIASTOLIC BLOOD PRESSURE: 70 MMHG

## 2024-12-04 DIAGNOSIS — O21.9 NAUSEA/VOMITING IN PREGNANCY: ICD-10-CM

## 2024-12-04 DIAGNOSIS — Z34.90 PRENATAL CARE, ANTEPARTUM: Primary | ICD-10-CM

## 2024-12-04 DIAGNOSIS — Z34.90 PREGNANCY, UNSPECIFIED GESTATIONAL AGE: ICD-10-CM

## 2024-12-04 LAB
GLUCOSE UR STRIP-MCNC: NEGATIVE MG/DL
PROT UR STRIP-MCNC: NEGATIVE MG/DL

## 2024-12-04 NOTE — PROGRESS NOTES
OB FOLLOW UP  CC- Here for care of pregnancy        Brittny Hill is a 29 y.o.  18w6d patient being seen today for her obstetrical follow up visit. Patient reports no complaints    Her prenatal care is complicated by (and status) :   Patient Active Problem List   Diagnosis    Screening for cervical cancer    Retroverted uterus    Pregnancy    Nausea/vomiting in pregnancy    Mother positive for group B Streptococcus colonization       Flu Status: Declines  Ultrasound Today: No    AFP: declines    ROS -   Patient Denies: leaking of fluid, vaginal bleeding, dysuria, excessive vomiting, and more than 6 contractions per hour  All other systems reviewed and are negative.       The additional following portions of the patient's history were reviewed and updated as appropriate: allergies, current medications, past family history, past medical history, past social history, past surgical history, and problem list.      I have reviewed and agree with the HPI, ROS, and historical information as entered above.   Brandon Holm MD          EXAM:     Prenatal Vitals  BP: 110/70  Weight: 68.5 kg (151 lb)   Fetal Heart Rate: 140         Urine Glucose Read-only: Negative  Urine Protein Read-only: Negative           Assessment and Plan    Problem List Items Addressed This Visit          Gynecologic and Obstetric Problems    Mother positive for group B Streptococcus colonization    Overview     Prenatal urine culture was positive for group B strep.  Rx for amoxicillin 500 mg 3 times daily for 7 days sent to pharmacy.  Will need group B strep prophylaxis in labor.         Nausea/vomiting in pregnancy    Overview     Improved on vitamin B6 6 and Unisom.  2024 Rx for Phenergan sent to pharmacy.    Managed with [id number] QID PRN            Other    Pregnancy    Overview     29-year-old   Dates consistent with 8-week 5-day ultrasound.  cfDNA- Negative, Male  Required progesterone supplementation with  previous pregnancy  History of precipitous delivery.         Relevant Orders    US Ob Follow Up Transabdominal Approach     Other Visit Diagnoses       Prenatal care, antepartum    -  Primary    Relevant Orders    POC Urinalysis Dipstick (Completed)            Pregnancy at 18w6d  Declines AFP screen today.  Nausea vomiting has improved.  No longer taking Phenergan and Reglan.  Fetal status reassuring.   Counseled on MSAFP alone in relation to OTD and placental issues.    Anatomy scan next visit.   Activity and Exercise discussed.  Patient is on Prenatal vitamins  Anomaly scan next visit.  Return in about 4 weeks (around 1/1/2025) for Anomaly scan ultrasound.    Brandon Holm MD  12/04/2024

## 2024-12-31 ENCOUNTER — TELEPHONE (OUTPATIENT)
Dept: OBSTETRICS AND GYNECOLOGY | Facility: CLINIC | Age: 29
End: 2024-12-31
Payer: MEDICAID

## 2024-12-31 DIAGNOSIS — N76.0 ACUTE VAGINITIS: Primary | ICD-10-CM

## 2024-12-31 RX ORDER — MICONAZOLE NITRATE 2 %
1 CREAM WITH APPLICATOR VAGINAL NIGHTLY
Qty: 45 G | Refills: 0 | Status: SHIPPED | OUTPATIENT
Start: 2024-12-31 | End: 2025-01-07

## 2024-12-31 NOTE — TELEPHONE ENCOUNTER
22w5d. Symptoms: burning, itching, cottage cheese d/c. Recent yeast infection a few months ago. Denies concern for STI. Miconazole prescribed. Call if symptoms persist or worsen.

## 2024-12-31 NOTE — TELEPHONE ENCOUNTER
PT is calling because she has another suspected yeast infection and she is out of town until this weekend and would like to know if we can send in a cream for the yeast infection to [facility] [address], [address], [place] [id number]

## 2025-01-02 ENCOUNTER — TELEPHONE (OUTPATIENT)
Dept: OBSTETRICS AND GYNECOLOGY | Facility: CLINIC | Age: 30
End: 2025-01-02

## 2025-01-02 NOTE — TELEPHONE ENCOUNTER
Patient of Dr. Holm;  @ 23w 0d. LOV 24.   Attempted to return patient's call. Left voice message informing patient that she can contact pharmacy and request to have Rx transferred; it is okay to use Miconazole during pregnancy. She can call us back if she has other questions.

## 2025-01-02 NOTE — TELEPHONE ENCOUNTER
Caller: Brittny Hill    Relationship: Self    Best call back number: [phone] IF NEEDED CALL ANYTIME.     Requested Prescriptions:   Requested Prescriptions      No prescriptions requested or ordered in this encounter      miconazole (MICOTIN) 2 % vaginal cream SENT TO NEW [facility] LOCATION.    Pharmacy where request should be sent: [facility] - [address] [address] - [phone] [facility] [phone] FX     Last office visit with prescribing clinician: 3/29/2024   Last telemedicine visit with prescribing clinician: Visit date not found   Next office visit with prescribing clinician: 1/6/2025     Additional details provided by patient:  PREVIOUS [facility] LOCATION ([facility]) WAS UNABLE TO FILL RX. PATIENT WAS TOLD TO CALL AND ASK TO RESEND TO [facility] PHONE NUMBER: [phone].    PATIENT ALSO WANTS TO CONFIRM OKAY TO USE MICONAZOLE WHILE PREGNANT.      Does the patient have less than a 3 day supply:  [x] Yes  [] No    Would you like a call back once the refill request has been completed: [] Yes [x] No    If the office needs to give you a call back, can they leave a voicemail: [x] Yes [] No    Radha Preciado Rep   01/02/25 13:31 EST

## 2025-01-20 ENCOUNTER — ROUTINE PRENATAL (OUTPATIENT)
Dept: OBSTETRICS AND GYNECOLOGY | Facility: CLINIC | Age: 30
End: 2025-01-20
Payer: MEDICAID

## 2025-01-20 VITALS — BODY MASS INDEX: 25.92 KG/M2 | DIASTOLIC BLOOD PRESSURE: 70 MMHG | WEIGHT: 160.6 LBS | SYSTOLIC BLOOD PRESSURE: 106 MMHG

## 2025-01-20 DIAGNOSIS — Z34.90 PREGNANCY, UNSPECIFIED GESTATIONAL AGE: ICD-10-CM

## 2025-01-20 DIAGNOSIS — O21.9 NAUSEA/VOMITING IN PREGNANCY: ICD-10-CM

## 2025-01-20 DIAGNOSIS — Z12.4 SCREENING FOR CERVICAL CANCER: ICD-10-CM

## 2025-01-20 LAB
GLUCOSE UR STRIP-MCNC: NEGATIVE MG/DL
PROT UR STRIP-MCNC: NEGATIVE MG/DL

## 2025-01-20 NOTE — PROGRESS NOTES
OB FOLLOW UP  CC- Here for care of pregnancy        Brittny Hill is a 29 y.o.  25w4d patient being seen today for her obstetrical follow up visit. Patient reports a rash on her back.     Her prenatal care is complicated by (and status) :   Patient Active Problem List   Diagnosis    Screening for cervical cancer    Retroverted uterus    Pregnancy    Nausea/vomiting in pregnancy    Mother positive for group B Streptococcus colonization       Flu Status: Declines  Ultrasound Today: Yes  Reviewed 1 hr glucose testing and TDAP next visit.    ROS -   Patient Denies: leaking of fluid, vaginal bleeding, dysuria, excessive vomiting, and more than 6 contractions per hour  Fetal Movement : normal  All other systems reviewed and are negative.       The additional following portions of the patient's history were reviewed and updated as appropriate: allergies, current medications, past family history, past medical history, past social history, past surgical history, and problem list.      I have reviewed and agree with the HPI, ROS, and historical information as entered above.   Brandon Holm MD      /70   Wt 72.8 kg (160 lb 9.6 oz)   LMP 2024   BMI 25.92 kg/m²       EXAM:     Prenatal Vitals  BP: 106/70  Weight: 72.8 kg (160 lb 9.6 oz)   Fetal Heart Rate: 153 US               Urine Glucose Read-only: Negative  Urine Protein Read-only: Negative       Assessment and Plan    Problem List Items Addressed This Visit          Gynecologic and Obstetric Problems    Mother positive for group B Streptococcus colonization - Primary    Overview     Prenatal urine culture was positive for group B strep.  Rx for amoxicillin 500 mg 3 times daily for 7 days sent to pharmacy.  Will need group B strep prophylaxis in labor.         Nausea/vomiting in pregnancy    Overview     Improved on vitamin B6 6 and Unisom.  2024 Rx for Phenergan sent to pharmacy.    Managed with [id number] QID PRN    2024;   nausea vomiting has resolved.            Other    Pregnancy    Overview     29-year-old   Dates consistent with 8-week 5-day ultrasound.  cfDNA- Negative, Male  Required progesterone supplementation with previous pregnancy  History of precipitous delivery.         Relevant Orders    POC Urinalysis Dipstick (Completed)    Screening for cervical cancer    Overview     Pap normal 2021  Pap smear 2022 was negative. HPV high risk pool negative.            Pregnancy at 25w4d; normal growth on ultrasound today with estimated fetal weight of 60%.  Fetal status reassuring.  Nausea vomiting has resolved.  anatomy scan completed today and within normal limits.  1 hour gtt, CBC, Antibody screen, TDAP, and RPR next visit. Instructions given  Discussed/encouraged TDAP vaccination after 28 weeks  Activity and Exercise discussed.  Return in about 3 weeks (around 2/10/2025) for One hour Glucose Screen.      Brandon Holm MD  2025

## 2025-01-22 ENCOUNTER — HOSPITAL ENCOUNTER (EMERGENCY)
Facility: HOSPITAL | Age: 30
Discharge: HOME | End: 2025-01-22
Attending: EMERGENCY MEDICINE
Payer: MEDICAID

## 2025-01-22 VITALS
BODY MASS INDEX: 24.91 KG/M2 | RESPIRATION RATE: 19 BRPM | DIASTOLIC BLOOD PRESSURE: 79 MMHG | HEIGHT: 66 IN | WEIGHT: 155 LBS | TEMPERATURE: 98.7 F | OXYGEN SATURATION: 99 % | HEART RATE: 116 BPM | SYSTOLIC BLOOD PRESSURE: 114 MMHG

## 2025-01-22 DIAGNOSIS — J10.1 INFLUENZA A: Primary | ICD-10-CM

## 2025-01-22 LAB
ALBUMIN SERPL-MCNC: 3.9 G/DL (ref 3.5–5.2)
ALBUMIN/GLOB SERPL: 1.6 G/DL
ALP SERPL-CCNC: 60 U/L (ref 39–117)
ALT SERPL W P-5'-P-CCNC: 9 U/L (ref 1–33)
ANION GAP SERPL CALCULATED.3IONS-SCNC: 10 MMOL/L (ref 5–15)
AST SERPL-CCNC: 16 U/L (ref 1–32)
B PARAPERT DNA SPEC QL NAA+PROBE: NOT DETECTED
B PERT DNA SPEC QL NAA+PROBE: NOT DETECTED
BASOPHILS # BLD AUTO: 0.01 10*3/MM3 (ref 0–0.2)
BASOPHILS NFR BLD AUTO: 0.2 % (ref 0–1.5)
BILIRUB SERPL-MCNC: 0.3 MG/DL (ref 0–1.2)
BUN SERPL-MCNC: 3 MG/DL (ref 6–20)
BUN/CREAT SERPL: 6.8 (ref 7–25)
C PNEUM DNA NPH QL NAA+NON-PROBE: NOT DETECTED
CALCIUM SPEC-SCNC: 9 MG/DL (ref 8.6–10.5)
CHLORIDE SERPL-SCNC: 99 MMOL/L (ref 98–107)
CO2 SERPL-SCNC: 25 MMOL/L (ref 22–29)
CREAT SERPL-MCNC: 0.44 MG/DL (ref 0.57–1)
DEPRECATED RDW RBC AUTO: 40.8 FL (ref 37–54)
EGFRCR SERPLBLD CKD-EPI 2021: 135.3 ML/MIN/1.73
EOSINOPHIL # BLD AUTO: 0 10*3/MM3 (ref 0–0.4)
EOSINOPHIL NFR BLD AUTO: 0 % (ref 0.3–6.2)
ERYTHROCYTE [DISTWIDTH] IN BLOOD BY AUTOMATED COUNT: 12.6 % (ref 12.3–15.4)
FLUAV H3 RNA NPH QL NAA+PROBE: DETECTED
FLUBV RNA ISLT QL NAA+PROBE: NOT DETECTED
GLOBULIN UR ELPH-MCNC: 2.4 GM/DL
GLUCOSE SERPL-MCNC: 101 MG/DL (ref 65–99)
HADV DNA SPEC NAA+PROBE: NOT DETECTED
HCOV 229E RNA SPEC QL NAA+PROBE: NOT DETECTED
HCOV HKU1 RNA SPEC QL NAA+PROBE: NOT DETECTED
HCOV NL63 RNA SPEC QL NAA+PROBE: NOT DETECTED
HCOV OC43 RNA SPEC QL NAA+PROBE: NOT DETECTED
HCT VFR BLD AUTO: 32.2 % (ref 34–46.6)
HGB BLD-MCNC: 10.7 G/DL (ref 12–15.9)
HMPV RNA NPH QL NAA+NON-PROBE: NOT DETECTED
HPIV1 RNA ISLT QL NAA+PROBE: NOT DETECTED
HPIV2 RNA SPEC QL NAA+PROBE: NOT DETECTED
HPIV3 RNA NPH QL NAA+PROBE: NOT DETECTED
HPIV4 P GENE NPH QL NAA+PROBE: NOT DETECTED
IMM GRANULOCYTES # BLD AUTO: 0.04 10*3/MM3 (ref 0–0.05)
IMM GRANULOCYTES NFR BLD AUTO: 0.9 % (ref 0–0.5)
LYMPHOCYTES # BLD AUTO: 0.38 10*3/MM3 (ref 0.7–3.1)
LYMPHOCYTES NFR BLD AUTO: 8.1 % (ref 19.6–45.3)
M PNEUMO IGG SER IA-ACNC: NOT DETECTED
MAGNESIUM SERPL-MCNC: 1.8 MG/DL (ref 1.6–2.6)
MCH RBC QN AUTO: 29.4 PG (ref 26.6–33)
MCHC RBC AUTO-ENTMCNC: 33.2 G/DL (ref 31.5–35.7)
MCV RBC AUTO: 88.5 FL (ref 79–97)
MONOCYTES # BLD AUTO: 0.44 10*3/MM3 (ref 0.1–0.9)
MONOCYTES NFR BLD AUTO: 9.4 % (ref 5–12)
NEUTROPHILS NFR BLD AUTO: 3.83 10*3/MM3 (ref 1.7–7)
NEUTROPHILS NFR BLD AUTO: 81.4 % (ref 42.7–76)
NRBC BLD AUTO-RTO: 0 /100 WBC (ref 0–0.2)
PLATELET # BLD AUTO: 178 10*3/MM3 (ref 140–450)
PMV BLD AUTO: 9.7 FL (ref 6–12)
POTASSIUM SERPL-SCNC: 3.6 MMOL/L (ref 3.5–5.2)
PROT SERPL-MCNC: 6.3 G/DL (ref 6–8.5)
RBC # BLD AUTO: 3.64 10*6/MM3 (ref 3.77–5.28)
RHINOVIRUS RNA SPEC NAA+PROBE: NOT DETECTED
RSV RNA NPH QL NAA+NON-PROBE: NOT DETECTED
S PYO AG THROAT QL: NEGATIVE
SARS-COV-2 RNA NPH QL NAA+NON-PROBE: NOT DETECTED
SODIUM SERPL-SCNC: 134 MMOL/L (ref 136–145)
WBC NRBC COR # BLD AUTO: 4.7 10*3/MM3 (ref 3.4–10.8)

## 2025-01-22 PROCEDURE — 80053 COMPREHEN METABOLIC PANEL: CPT | Performed by: PHYSICIAN ASSISTANT

## 2025-01-22 PROCEDURE — 93005 ELECTROCARDIOGRAM TRACING: CPT

## 2025-01-22 PROCEDURE — 87081 CULTURE SCREEN ONLY: CPT | Performed by: PHYSICIAN ASSISTANT

## 2025-01-22 PROCEDURE — 99283 EMERGENCY DEPT VISIT LOW MDM: CPT

## 2025-01-22 PROCEDURE — 0202U NFCT DS 22 TRGT SARS-COV-2: CPT | Performed by: PHYSICIAN ASSISTANT

## 2025-01-22 PROCEDURE — 87880 STREP A ASSAY W/OPTIC: CPT | Performed by: PHYSICIAN ASSISTANT

## 2025-01-22 PROCEDURE — 25810000003 SODIUM CHLORIDE 0.9 % SOLUTION: Performed by: PHYSICIAN ASSISTANT

## 2025-01-22 PROCEDURE — 83735 ASSAY OF MAGNESIUM: CPT | Performed by: PHYSICIAN ASSISTANT

## 2025-01-22 PROCEDURE — 96360 HYDRATION IV INFUSION INIT: CPT

## 2025-01-22 PROCEDURE — 93005 ELECTROCARDIOGRAM TRACING: CPT | Performed by: EMERGENCY MEDICINE

## 2025-01-22 PROCEDURE — 85025 COMPLETE CBC W/AUTO DIFF WBC: CPT | Performed by: PHYSICIAN ASSISTANT

## 2025-01-22 RX ORDER — ACETAMINOPHEN 325 MG/1
650 TABLET ORAL ONCE
Status: COMPLETED | OUTPATIENT
Start: 2025-01-22 | End: 2025-01-22

## 2025-01-22 RX ORDER — OSELTAMIVIR PHOSPHATE 75 MG/1
75 CAPSULE ORAL 2 TIMES DAILY
Qty: 10 CAPSULE | Refills: 0 | Status: SHIPPED | OUTPATIENT
Start: 2025-01-22 | End: 2025-01-27

## 2025-01-22 RX ORDER — OSELTAMIVIR PHOSPHATE 75 MG/1
75 CAPSULE ORAL ONCE
Status: COMPLETED | OUTPATIENT
Start: 2025-01-22 | End: 2025-01-22

## 2025-01-22 RX ADMIN — OSELTAMIVIR PHOSPHATE 75 MG: 75 CAPSULE ORAL at 22:33

## 2025-01-22 RX ADMIN — ACETAMINOPHEN 650 MG: 325 TABLET ORAL at 22:33

## 2025-01-22 RX ADMIN — SODIUM CHLORIDE 1000 ML: 9 INJECTION, SOLUTION INTRAVENOUS at 20:30

## 2025-01-23 NOTE — ED PROVIDER NOTES
Subjective  History of Present Illness:    Chief Complaint: Flulike symptoms  History of Present Illness: 29-year-old female 24 weeks pregnant with flulike symptoms, she has had bodyaches, cough and congestion, she states her son was sick recently with viral illness.  She has had all normal prenatal care with no complications.  She has been having symptoms for 1 to 2 days.  She was seen in the office this week, and given a breathing treatment and an albuterol inhaler which she has been using.  She continues to have a cough and congestion as well as a low-grade fever.  No vaginal bleeding, she states that she is feeling baby move regularly.  Onset: Gradual  Duration: 1 to 2 days  Exacerbating / Alleviating factors: Cough congestion, body aches  Associated symptoms: Low-grade fever      Nurses Notes reviewed and agree, including vitals, allergies, social history and prior medical history.     REVIEW OF SYSTEMS: All systems reviewed and not pertinent unless noted.    Review of Systems   Constitutional:  Positive for fever.   HENT:  Positive for congestion.    Respiratory:  Positive for cough.    All other systems reviewed and are negative.      Past Medical History:   Diagnosis Date    Dysmenorrhea 03/29/2024    3/29/2024; menses are light.  Dysmenorrhea days 1 and 2 of cycle.  Can try Aleve; declines Rx for OCPs.      History of hyperemesis gravidarum        Allergies:    Latex      Past Surgical History:   Procedure Laterality Date    FOOT SURGERY      at age 13 on left foot         Social History     Socioeconomic History    Marital status:      Spouse name: Leyda     Number of children: 1    Highest education level: High school graduate   Tobacco Use    Smoking status: Never    Smokeless tobacco: Never   Vaping Use    Vaping status: Former    Devices: States that she only tried vaping for a short period of time   Substance and Sexual Activity    Alcohol use: Not Currently    Drug use: Never    Sexual  "activity: Yes     Partners: Male         Family History   Problem Relation Age of Onset    Hypertension Father        Objective  Physical Exam:  /79   Pulse 116   Temp 98.7 °F (37.1 °C)   Resp 19   Ht 167.6 cm (66\")   Wt 70.3 kg (155 lb)   LMP 07/25/2024   SpO2 99%   BMI 25.02 kg/m²      Physical Exam  Vitals and nursing note reviewed.   Constitutional:       Appearance: She is well-developed.   HENT:      Head: Normocephalic and atraumatic.   Cardiovascular:      Rate and Rhythm: Normal rate and regular rhythm.   Pulmonary:      Effort: Pulmonary effort is normal.      Breath sounds: Normal breath sounds.   Abdominal:      Palpations: Abdomen is soft.   Musculoskeletal:         General: Normal range of motion.      Cervical back: Normal range of motion and neck supple.   Skin:     General: Skin is warm and dry.   Neurological:      Mental Status: She is alert and oriented to person, place, and time.      Deep Tendon Reflexes: Reflexes are normal and symmetric.           Procedures    ED Course:    ED Course as of 01/22/25 2341 Wed Jan 22, 2025 2019 Review of previous  non ED visits, prior labs, prior imaging, available notes from prior evaluations or visits with specialists, medication list, allergies, past medical history, past surgical history        Review of routine prenatal visit on 1/20/2025 with Dr. Galaviz [CS]   2147 I updated the patient/family  on all pending labs and lab results, and all pending radiology and  results and answered all questions.   [CS]   2150 First dose of Tamiflu initiated [CS]   2236 Patient requesting to be tested for strep [CS]      ED Course User Index  [CS] Yrn Geller Jr., DARLENE       Lab Results (last 24 hours)       Procedure Component Value Units Date/Time    CBC Auto Differential [[id number]]  (Abnormal) Collected: 01/22/25 2029    Specimen: Blood Updated: 01/22/25 2038     WBC 4.70 10*3/mm3      RBC 3.64 10*6/mm3      Hemoglobin 10.7 g/dL      Hematocrit " 32.2 %      MCV 88.5 fL      MCH 29.4 pg      MCHC 33.2 g/dL      RDW 12.6 %      RDW-SD 40.8 fl      MPV 9.7 fL      Platelets 178 10*3/mm3      Neutrophil % 81.4 %      Lymphocyte % 8.1 %      Monocyte % 9.4 %      Eosinophil % 0.0 %      Basophil % 0.2 %      Immature Grans % 0.9 %      Neutrophils, Absolute 3.83 10*3/mm3      Lymphocytes, Absolute 0.38 10*3/mm3      Monocytes, Absolute 0.44 10*3/mm3      Eosinophils, Absolute 0.00 10*3/mm3      Basophils, Absolute 0.01 10*3/mm3      Immature Grans, Absolute 0.04 10*3/mm3      nRBC 0.0 /100 WBC     Comprehensive Metabolic Panel [[id number]]  (Abnormal) Collected: 01/22/25 2029    Specimen: Blood Updated: 01/22/25 2102     Glucose 101 mg/dL      BUN 3 mg/dL      Creatinine 0.44 mg/dL      Sodium 134 mmol/L      Potassium 3.6 mmol/L      Chloride 99 mmol/L      CO2 25.0 mmol/L      Calcium 9.0 mg/dL      Total Protein 6.3 g/dL      Albumin 3.9 g/dL      ALT (SGPT) 9 U/L      AST (SGOT) 16 U/L      Alkaline Phosphatase 60 U/L      Total Bilirubin 0.3 mg/dL      Globulin 2.4 gm/dL      Comment: Calculated Result        A/G Ratio 1.6 g/dL      BUN/Creatinine Ratio 6.8     Anion Gap 10.0 mmol/L      eGFR 135.3 mL/min/1.73     Narrative:      GFR Categories in Chronic Kidney Disease (CKD)      GFR Category          GFR (mL/min/1.73)    Interpretation  G1                     90 or greater         Normal or high (1)  G2                      60-89                Mild decrease (1)  G3a                   45-59                Mild to moderate decrease  G3b                   30-44                Moderate to severe decrease  G4                    15-29                Severe decrease  G5                    14 or less           Kidney failure          (1)In the absence of evidence of kidney disease, neither GFR category G1 or G2 fulfill the criteria for CKD.    eGFR calculation 2021 CKD-EPI creatinine equation, which does not include race as a factor    Magnesium [[id number]]   (Normal) Collected: 01/22/25 2029    Specimen: Blood Updated: 01/22/25 2102     Magnesium 1.8 mg/dL     Respiratory Panel PCR w/COVID-19(SARS-CoV-2) [facility]/PEPPER/[facility]/SHERYL In-House, NP Swab in UTM/VTM, 2 HR TAT - Swab, Nasopharynx [[id number]]  (Abnormal) Collected: 01/22/25 2030    Specimen: Swab from Nasopharynx Updated: 01/22/25 2134     ADENOVIRUS, PCR Not Detected     Coronavirus 229E Not Detected     Coronavirus HKU1 Not Detected     Coronavirus NL63 Not Detected     Coronavirus OC43 Not Detected     COVID19 Not Detected     Human Metapneumovirus Not Detected     Human Rhinovirus/Enterovirus Not Detected     Influenza A H3 Detected     Influenza B PCR Not Detected     Parainfluenza Virus 1 Not Detected     Parainfluenza Virus 2 Not Detected     Parainfluenza Virus 3 Not Detected     Parainfluenza Virus 4 Not Detected     RSV, PCR Not Detected     Bordetella pertussis pcr Not Detected     Bordetella parapertussis PCR Not Detected     Chlamydophila pneumoniae PCR Not Detected     Mycoplasma pneumo by PCR Not Detected    Narrative:      In the setting of a positive respiratory panel with a viral infection PLUS a negative procalcitonin without other underlying concern for bacterial infection, consider observing off antibiotics or discontinuation of antibiotics and continue supportive care. If the respiratory panel is positive for atypical bacterial infection (Bordetella pertussis, Chlamydophila pneumoniae, or Mycoplasma pneumoniae), consider antibiotic de-escalation to target atypical bacterial infection.    Rapid Strep A Screen - Swab, Throat [[id number]]  (Normal) Collected: 01/22/25 2255    Specimen: Swab from Throat Updated: 01/22/25 2324     Strep A Ag Negative    Narrative:      Test performed by [facility].    Beta Strep Culture, Throat - Swab, Throat [[id number]] Collected: 01/22/25 2255    Specimen: Swab from Throat Updated: 01/22/25 2323             No radiology results from the last 24 hrs                                                                   Medical Decision Making  Problems Addressed:  Influenza A: complicated acute illness or injury    Amount and/or Complexity of Data Reviewed  External Data Reviewed: labs and notes.  Labs: ordered. Decision-making details documented in ED Course.  ECG/medicine tests: ordered.    Risk  OTC drugs.  Prescription drug management.  Risk Details: 29-year-old female presented with flulike symptoms, 24 weeks pregnant.  Patient has been feeling baby movement, no pregnancy complaints.  Bedside fetal heart tones were obtained.  Patient had a respiratory panel and labs performed, was given fluids, she was found to be positive for influenza A.  She was administered 10 days and recommend close follow-up.          Final diagnoses:   Influenza A           Disposition DISCHARGE    Patient discharged in stable condition.    Reviewed implications of results, diagnosis, meds, responsibility to follow up, warning signs and symptoms of possible worsening, potential complications and reasons to return to ER.    Patient/Family voiced understanding of above instructions.    Discussed plan for discharge, as there is no emergent indication for admission.  Pt/family is agreeable and understands need for follow up and possible repeat testing.  Pt/family is aware that discharge does not mean that nothing is wrong but that it indicates no emergency is currently present that requires admission and they must continue care with follow-up as given below or with a physician of their choice.     FOLLOW-UP  [facility] EMERGENCY DEPARTMENT  [address] [facility] [address] [phone]    If symptoms worsen         Medication List        New Prescriptions      oseltamivir 75 MG capsule  Commonly known as: TAMIFLU  Take 1 capsule by mouth 2 (Two) Times a Day for 5 days.               Where to Get Your Medications        These medications were sent to  [id number] - [place], [place] - [address] AT [facility] [address] - [phone]  - [phone] FX  [address], [facility] [address]      Phone: [phone]   oseltamivir 75 MG capsule             Yrn Geller Jr., PALeannaC  01/22/25 4741

## 2025-01-24 LAB
QT INTERVAL: 314 MS
QTC INTERVAL: 445 MS

## 2025-01-25 LAB — BACTERIA SPEC AEROBE CULT: NORMAL

## 2025-02-10 ENCOUNTER — ROUTINE PRENATAL (OUTPATIENT)
Dept: OBSTETRICS AND GYNECOLOGY | Facility: CLINIC | Age: 30
End: 2025-02-10
Payer: MEDICAID

## 2025-02-10 VITALS — SYSTOLIC BLOOD PRESSURE: 112 MMHG | WEIGHT: 161.4 LBS | DIASTOLIC BLOOD PRESSURE: 66 MMHG | BODY MASS INDEX: 26.05 KG/M2

## 2025-02-10 DIAGNOSIS — Z28.21 REFUSES TETANUS, DIPHTHERIA, AND ACELLULAR PERTUSSIS (TDAP) VACCINATION: ICD-10-CM

## 2025-02-10 DIAGNOSIS — Z34.83 PRENATAL CARE, SUBSEQUENT PREGNANCY, THIRD TRIMESTER: Primary | ICD-10-CM

## 2025-02-10 DIAGNOSIS — Z34.90 PREGNANCY, UNSPECIFIED GESTATIONAL AGE: ICD-10-CM

## 2025-02-10 PROBLEM — Z12.4 SCREENING FOR CERVICAL CANCER: Status: RESOLVED | Noted: 2021-02-19 | Resolved: 2025-02-10

## 2025-02-10 LAB
GLUCOSE UR STRIP-MCNC: NEGATIVE MG/DL
PROT UR STRIP-MCNC: NEGATIVE MG/DL

## 2025-02-10 NOTE — PROGRESS NOTES
OB FOLLOW UP  CC- Here for care of pregnancy        Brittny Hill is a 29 y.o.  28w4d patient being seen today for her obstetrical follow up. Patient reports no complaints.     Patient undergoing Glucola testing today. She is due for her testing at 11:40 AM.       MBT: O+  Rhogam: is not indicated.  28 week packet: reviewed with patient , counseled on fetal movement , pediatrician list reviewed, and breast pump discussed  TDAP: declines  Flu Status: Declines  Ultrasound Today: No    Her prenatal care is complicated by (and status) : see below.  Patient Active Problem List   Diagnosis    Retroverted uterus    Pregnancy    Nausea/vomiting in pregnancy    Mother positive for group B Streptococcus colonization    Refuses tetanus, diphtheria, and acellular pertussis (Tdap) vaccination         ROS -   Patient Denies: Loss of Fluid, Vaginal Spotting, Vision Changes, Headaches, Nausea , Vomiting , Contractions, Epigastric pain, and skin itching  Fetal Movement : normal  Other than what is documented in the HPI, all other systems reviewed and are negative.     The additional following portions of the patient's history were reviewed and updated as appropriate: allergies and current medications.    I have reviewed and agree with the HPI, ROS, and historical information as entered above. Chely Kulkarni, APRN      /66   Wt 73.2 kg (161 lb 6.4 oz)   LMP 2024   BMI 26.05 kg/m²         EXAM:     Prenatal Vitals  BP: 112/66  Weight: 73.2 kg (161 lb 6.4 oz)   Fetal Heart Rate: 140               Urine Glucose Read-only: Negative  Urine Protein Read-only: Negative         Assessment and Plan    Problem List Items Addressed This Visit          Gravid and     Pregnancy    Overview     29-year-old   Dates consistent with 8-week 5-day ultrasound.  cfDNA- Negative, Male  Required progesterone supplementation with previous pregnancy  History of precipitous delivery.            Other    Refuses  tetanus, diphtheria, and acellular pertussis (Tdap) vaccination     Other Visit Diagnoses       Prenatal care, subsequent pregnancy, third trimester    -  Primary    Relevant Orders    CBC (No Diff)    Gestational Screen 1 Hr ([facility])    Antibody Screen    RPR, Rfx Qn RPR / Confirm TP    POC Urinalysis Dipstick (Completed)            Pregnancy at 28w4d  1 hr Glucola, CBC, RPR. Antibody screen and TDAP declines  Fetal movement/PTL or Labor precautions  Lab(s) Ordered  Patient is on Prenatal vitamins  Discussed/encouraged TDAP vaccination after 28 weeks  Reviewed Pre-eclampsia signs/symptoms  Activity and Exercise discussed.  Follow up in four weeks DOMINGA Kulkarni, APRN  02/10/2025

## 2025-02-11 LAB
BLD GP AB SCN SERPL QL: NEGATIVE
ERYTHROCYTE [DISTWIDTH] IN BLOOD BY AUTOMATED COUNT: 11.8 % (ref 12.3–15.4)
GLUCOSE 1H P 50 G GLC PO SERPL-MCNC: 60 MG/DL (ref 65–139)
HCT VFR BLD AUTO: 31.2 % (ref 34–46.6)
HGB BLD-MCNC: 10.5 G/DL (ref 12–15.9)
MCH RBC QN AUTO: 28.8 PG (ref 26.6–33)
MCHC RBC AUTO-ENTMCNC: 33.7 G/DL (ref 31.5–35.7)
MCV RBC AUTO: 85.7 FL (ref 79–97)
PLATELET # BLD AUTO: 268 10*3/MM3 (ref 140–450)
RBC # BLD AUTO: 3.64 10*6/MM3 (ref 3.77–5.28)
RPR SER QL: NON REACTIVE
WBC # BLD AUTO: 7.05 10*3/MM3 (ref 3.4–10.8)

## 2025-02-13 DIAGNOSIS — O99.019 MATERNAL ANEMIA IN PREGNANCY, ANTEPARTUM: Primary | ICD-10-CM

## 2025-02-13 RX ORDER — CHOLECALCIFEROL (VITAMIN D3) 10(400)/ML
1 DROPS ORAL DAILY
Qty: 30 EACH | Refills: 12 | Status: SHIPPED | OUTPATIENT
Start: 2025-02-13

## 2025-03-31 ENCOUNTER — ROUTINE PRENATAL (OUTPATIENT)
Dept: OBSTETRICS AND GYNECOLOGY | Facility: CLINIC | Age: 30
End: 2025-03-31
Payer: MEDICAID

## 2025-03-31 ENCOUNTER — LAB (OUTPATIENT)
Dept: LAB | Facility: HOSPITAL | Age: 30
End: 2025-03-31
Payer: MEDICAID

## 2025-03-31 VITALS — WEIGHT: 166.6 LBS | BODY MASS INDEX: 26.89 KG/M2 | DIASTOLIC BLOOD PRESSURE: 70 MMHG | SYSTOLIC BLOOD PRESSURE: 116 MMHG

## 2025-03-31 DIAGNOSIS — Z3A.35 35 WEEKS GESTATION OF PREGNANCY: ICD-10-CM

## 2025-03-31 DIAGNOSIS — Z87.59 HISTORY OF PRECIPITOUS DELIVERY: ICD-10-CM

## 2025-03-31 DIAGNOSIS — Z34.90 PREGNANCY, UNSPECIFIED GESTATIONAL AGE: Primary | ICD-10-CM

## 2025-03-31 LAB
GLUCOSE UR STRIP-MCNC: NEGATIVE MG/DL
PROT UR STRIP-MCNC: NEGATIVE MG/DL

## 2025-03-31 PROCEDURE — 99213 OFFICE O/P EST LOW 20 MIN: CPT | Performed by: OBSTETRICS & GYNECOLOGY

## 2025-03-31 PROCEDURE — 87081 CULTURE SCREEN ONLY: CPT

## 2025-03-31 NOTE — PROGRESS NOTES
"    OB FOLLOW UP  CC- Here for care of pregnancy        Brittny Hill is a 30 y.o.  35w4d patient being seen today for her obstetrical follow up visit. Patient reports hemorrhoids for about 3-4 days, uses only Vaseline, pt reports forgetting to get Preparation H medication OTC.  Pt asking if she can taking liquid magnesium to \"prepare for birth and overall wellness\".      Her prenatal care is complicated by (and status) : see below.  Patient Active Problem List   Diagnosis    Retroverted uterus    Pregnancy    Nausea/vomiting in pregnancy    Mother positive for group B Streptococcus colonization    Refuses tetanus, diphtheria, and acellular pertussis (Tdap) vaccination    History of precipitous delivery       GBS Status: Done Today. She is not allergic to PCN.    Allergies   Allergen Reactions    Latex Rash          Flu Status: Declines  Her Delivery Plan is: Does not desire IOL    US today: no  Non Stress Test: No.    ROS -   Patient Denies: Loss of Fluid, Vaginal Spotting, Vision Changes, Headaches, Nausea , Vomiting , Contractions, Epigastric pain, and skin itching  Fetal Movement : normal  Other than what is documented in the HPI, all other systems reviewed and are negative.       The additional following portions of the patient's history were reviewed and updated as appropriate: allergies, current medications, past family history, past medical history, past social history, past surgical history, and problem list.    I have reviewed and agree with the HPI, ROS, and historical information as entered above.   Brandon Holm MD        EXAM:     Prenatal Vitals  BP: 116/70  Weight: 75.6 kg (166 lb 9.6 oz)   Fetal Heart Rate: 144   Fundal Height (cm): 34 cm   Dilation/Effacement/Station  Dilation: Closed  Effacement (%): 30  Station: -3      Urine Glucose Read-only: Negative  Urine Protein Read-only: Negative           Assessment and Plan    Problem List Items Addressed This Visit          " Gynecologic and Obstetric Problems    Mother positive for group B Streptococcus colonization    Overview   Prenatal urine culture was positive for group B strep.  Rx for amoxicillin 500 mg 3 times daily for 7 days sent to pharmacy.  Will need group B strep prophylaxis in labor.            Other    History of precipitous delivery    Overview   Did not have short labor.  Just short second stage labor.  Discussed option of induction at 39 weeks.         Pregnancy - Primary    Overview   29-year-old   Dates consistent with 8-week 5-day ultrasound.  cfDNA- Negative, Male  Required progesterone supplementation with previous pregnancy  History of precipitous delivery.         Relevant Orders    POC Urinalysis Dipstick (Completed)    Group B Streptococcus Culture - Swab, Vaginal/Rectum    US Ob Follow Up Transabdominal Approach    US Fetal Biophysical Profile;Without Non-Stress Testing       Pregnancy at 35w4d; group B strep screen done today.  Measure size less than dates.  Growth ultrasound next week.  History of rapid delivery.  Discussed options for induction of labor.  Patient declines to schedule induction at this time.  Fetal status reassuring.   Reviewed Pre-eclampsia signs/symptoms  Discussed options for IOL. Patient desires spontaneous labor. Would like to postpone an IOL unless medically indicated.   Delivery options reviewed with patient  Signs of labor reviewed  Kick counts reviewed  Activity and Exercise discussed.  Return in about 1 week (around 2025) for Growth ultrasound and biophysical profile.    Brandon Holm MD  2025

## 2025-04-02 LAB — BACTERIA SPEC AEROBE CULT: ABNORMAL

## 2025-04-07 ENCOUNTER — ROUTINE PRENATAL (OUTPATIENT)
Dept: OBSTETRICS AND GYNECOLOGY | Facility: CLINIC | Age: 30
End: 2025-04-07
Payer: MEDICAID

## 2025-04-07 VITALS — SYSTOLIC BLOOD PRESSURE: 122 MMHG | DIASTOLIC BLOOD PRESSURE: 82 MMHG | WEIGHT: 166.8 LBS | BODY MASS INDEX: 26.92 KG/M2

## 2025-04-07 DIAGNOSIS — N85.4 RETROVERTED UTERUS: Primary | ICD-10-CM

## 2025-04-07 DIAGNOSIS — Z34.90 PRENATAL CARE, ANTEPARTUM, UNSPECIFIED GRAVIDITY: ICD-10-CM

## 2025-04-07 DIAGNOSIS — Z34.90 PREGNANCY, UNSPECIFIED GESTATIONAL AGE: ICD-10-CM

## 2025-04-07 DIAGNOSIS — O21.9 NAUSEA/VOMITING IN PREGNANCY: ICD-10-CM

## 2025-04-07 DIAGNOSIS — Z87.59 HISTORY OF PRECIPITOUS DELIVERY: ICD-10-CM

## 2025-04-07 LAB
GLUCOSE UR STRIP-MCNC: NEGATIVE MG/DL
PROT UR STRIP-MCNC: NEGATIVE MG/DL

## 2025-04-07 PROCEDURE — 99213 OFFICE O/P EST LOW 20 MIN: CPT | Performed by: OBSTETRICS & GYNECOLOGY

## 2025-04-07 RX ORDER — ALBUTEROL SULFATE 90 UG/1
1 AEROSOL, METERED RESPIRATORY (INHALATION)
COMMUNITY
Start: 2025-01-21

## 2025-04-07 NOTE — PROGRESS NOTES
OB FOLLOW UP  CC- Here for care of pregnancy        Brittny Hill is a 30 y.o.  36w4d patient being seen today for her obstetrical follow up visit. Patient reports irregular BH contractions.     Her prenatal care is complicated by (and status) : see below.  Patient Active Problem List   Diagnosis    Retroverted uterus    Pregnancy    Nausea/vomiting in pregnancy    Mother positive for group B Streptococcus colonization    Refuses tetanus, diphtheria, and acellular pertussis (Tdap) vaccination    History of precipitous delivery       GBS Status: was positive in urine.    Allergies   Allergen Reactions    Latex Rash          Flu Status: Declines   Her Delivery Plan is: Undecided    US today: yes  Non Stress Test: No.    ROS -   Patient Denies: Loss of Fluid, Vaginal Spotting, Vision Changes, Headaches, Nausea , Vomiting , Epigastric pain, and skin itching  Fetal Movement : normal  Other than what is documented in the HPI, all other systems reviewed and are negative.       The additional following portions of the patient's history were reviewed and updated as appropriate: allergies, current medications, past family history, past medical history, past social history, past surgical history, and problem list.    I have reviewed and agree with the HPI, ROS, and historical information as entered above.   Brandon Holm MD        EXAM:     Prenatal Vitals  BP: 122/82  Weight: 75.7 kg (166 lb 12.8 oz)   Fetal Heart Rate: 131 bpm              Urine Glucose Read-only: Negative  Urine Protein Read-only: Negative           Assessment and Plan    Problem List Items Addressed This Visit          Gynecologic and Obstetric Problems    Mother positive for group B Streptococcus colonization    Overview   Prenatal urine culture was positive for group B strep.  Rx for amoxicillin 500 mg 3 times daily for 7 days sent to pharmacy.  Will need group B strep prophylaxis in labor.    3/31/2025; Group B strep screen was  positive.         Nausea/vomiting in pregnancy    Overview   Improved on vitamin B6 6 and Unisom.  2024 Rx for Phenergan sent to pharmacy.    Managed with Epxxbr20hr QID PRN    2024;  nausea vomiting has resolved.         Retroverted uterus - Primary       Other    History of precipitous delivery    Overview   Did not have short labor.  Just short second stage labor.  Discussed option of induction at 39 weeks.         Pregnancy    Overview   29-year-old   Dates consistent with 8-week 5-day ultrasound.  cfDNA- Negative, Male  Required progesterone supplementation with previous pregnancy  History of precipitous delivery.          Other Visit Diagnoses         Prenatal care, antepartum, unspecified         Relevant Orders    POC Urinalysis Dipstick (Completed)            Pregnancy at 36w4d  Growth ultrasound today was normal.EFW 49% AC 77%.  Fetal status reassuring.  Biophysical profile 8 out of 8.  KEVIN 6.6.  Encouraged PO fluids.   Reviewed Pre-eclampsia signs/symptoms  Discussed options for IOL. Patient desires spontaneous labor. Would like to postpone an IOL unless medically indicated.   Delivery options reviewed with patient  Signs of labor reviewed  Kick counts reviewed  Activity and Exercise discussed.  Return in about 1 week (around 2025) for Next scheduled follow up.    Brandon Holm MD  2025

## 2025-04-14 ENCOUNTER — ROUTINE PRENATAL (OUTPATIENT)
Dept: OBSTETRICS AND GYNECOLOGY | Facility: CLINIC | Age: 30
End: 2025-04-14
Payer: MEDICAID

## 2025-04-14 VITALS — DIASTOLIC BLOOD PRESSURE: 64 MMHG | WEIGHT: 169 LBS | SYSTOLIC BLOOD PRESSURE: 106 MMHG | BODY MASS INDEX: 27.28 KG/M2

## 2025-04-14 DIAGNOSIS — Z3A.37 37 WEEKS GESTATION OF PREGNANCY: ICD-10-CM

## 2025-04-14 DIAGNOSIS — Z87.59 HISTORY OF PRECIPITOUS DELIVERY: ICD-10-CM

## 2025-04-14 DIAGNOSIS — Z34.93 PRENATAL CARE, THIRD TRIMESTER: Primary | ICD-10-CM

## 2025-04-14 LAB
EXPIRATION DATE: 0
GLUCOSE UR STRIP-MCNC: NEGATIVE MG/DL
Lab: 0
PROT UR STRIP-MCNC: NEGATIVE MG/DL

## 2025-04-14 PROCEDURE — 99213 OFFICE O/P EST LOW 20 MIN: CPT | Performed by: OBSTETRICS & GYNECOLOGY

## 2025-04-14 NOTE — PROGRESS NOTES
OB FOLLOW UP  CC- Here for care of pregnancy        Brittny Hill is a 30 y.o.  37w4d patient being seen today for her obstetrical follow up visit. Patient reports heartburn (doesn't like the taste of Tums; recommended pepcid), trace BLE edema, and daily Jose Velez.     Her prenatal care is complicated by (and status): see below.  Patient Active Problem List   Diagnosis    Retroverted uterus    Pregnancy    Nausea/vomiting in pregnancy    Mother positive for group B Streptococcus colonization    Refuses tetanus, diphtheria, and acellular pertussis (Tdap) vaccination    History of precipitous delivery       GBS Status:   Group B Strep Culture   Date Value Ref Range Status   2025 Streptococcus agalactiae (Group B) (A)  Final     Comment:       This organism is considered to be universally susceptible to penicillin.  No further antibiotic testing will be performed. If Clindamycin or Erythromycin is the drug of choice, notify the laboratory within 7 days to request susceptibility testing.         Allergies   Allergen Reactions    Latex Rash          Flu Status: Declines  Her Delivery Plan is:  to be discussed     US today: no  Non Stress Test: No.    ROS -   Patient Denies: Loss of Fluid, Vaginal Spotting, Vision Changes, Headaches, Nausea , Vomiting , and skin itching  Fetal Movement: normal  Other than what is documented in the HPI, all other systems reviewed and are negative.       The additional following portions of the patient's history were reviewed and updated as appropriate: allergies, current medications, past family history, past medical history, past social history, past surgical history, and problem list.    I have reviewed and agree with the HPI, ROS, and historical information as entered above.   Brandon Holm MD        EXAM:     Prenatal Vitals  BP: 106/64  Weight: 76.7 kg (169 lb)   Fetal Heart Rate: 140              Urine Glucose Read-only: Negative  Urine Protein  Read-only: Negative           Assessment and Plan    Problem List Items Addressed This Visit          Gynecologic and Obstetric Problems    Mother positive for group B Streptococcus colonization    Overview   Prenatal urine culture was positive for group B strep.  Rx for amoxicillin 500 mg 3 times daily for 7 days sent to pharmacy.  Will need group B strep prophylaxis in labor.    3/31/2025; Group B strep screen was positive.            Other    History of precipitous delivery    Overview   Did not have short labor.  Just short second stage labor.  Discussed option of induction at 39 weeks.         Pregnancy    Overview   29-year-old   Dates consistent with 8-week 5-day ultrasound.  cfDNA- Negative, Male  Required progesterone supplementation with previous pregnancy  History of precipitous delivery.  Declines IOL.  U/s 36 wk EFW 49% AC 77%.         Relevant Orders    POC Glucose, Urine, Qualitative, Dipstick (Completed)    POC Protein, Urine, Qualitative, Dipstick (Completed)     Other Visit Diagnoses         Prenatal care, third trimester    -  Primary    Relevant Orders    POC Glucose, Urine, Qualitative, Dipstick (Completed)    POC Protein, Urine, Qualitative, Dipstick (Completed)            Pregnancy at 37w4d  Group strep screen positive.  Discussed IV penicillin when in labor or rupture membranes.  History of precipitous labor.  Declines induction of labor at this time.  Fetal status reassuring.   Reviewed Pre-eclampsia signs/symptoms  Discussed options for IOL. Patient desires spontaneous labor. Would like to postpone an IOL unless medically indicated.   Delivery options reviewed with patient  Signs of labor reviewed  Kick counts reviewed  Activity and Exercise discussed.  Return in about 1 week (around 2025) for Next scheduled follow up.    Brandon Holm MD  2025

## 2025-04-16 ENCOUNTER — TELEPHONE (OUTPATIENT)
Dept: OBSTETRICS AND GYNECOLOGY | Facility: CLINIC | Age: 30
End: 2025-04-16
Payer: MEDICAID

## 2025-04-16 NOTE — TELEPHONE ENCOUNTER
Pt is 37w6d. Pt reports vaginal itching and burning since yesterday. Pt denies urinary symptoms. Pt reports she is losing mucous plug but no white discharge. Pt denies any changes to soaps, detergents, lotions. Pt denies using anything over the counter to help her symptoms. Pt is unable to come in for appointment today but can come in tomorrow to be evaluated. Appt scheduled for tomorrow with SUZI Cao. Informed pt to call us back with worsening symptoms, bleeding, leaking, decreased movement or severe pain/contractions. Pt v/u.

## 2025-04-16 NOTE — TELEPHONE ENCOUNTER
JANETTE DURHAM    [phone]    PT WOULD LIKE SOMETHING CALLED IN FOR YEAST INFECTION TO [facility]

## 2025-04-17 ENCOUNTER — ROUTINE PRENATAL (OUTPATIENT)
Dept: OBSTETRICS AND GYNECOLOGY | Facility: CLINIC | Age: 30
End: 2025-04-17
Payer: MEDICAID

## 2025-04-17 VITALS — BODY MASS INDEX: 27.47 KG/M2 | SYSTOLIC BLOOD PRESSURE: 124 MMHG | WEIGHT: 170.2 LBS | DIASTOLIC BLOOD PRESSURE: 82 MMHG

## 2025-04-17 DIAGNOSIS — Z34.90 PRENATAL CARE, ANTEPARTUM, UNSPECIFIED GRAVIDITY: Primary | ICD-10-CM

## 2025-04-17 DIAGNOSIS — N76.0 ACUTE VAGINITIS: ICD-10-CM

## 2025-04-17 DIAGNOSIS — R82.90 ABNORMAL URINALYSIS: ICD-10-CM

## 2025-04-17 LAB
BILIRUB BLD-MCNC: NEGATIVE MG/DL
CLARITY, POC: CLEAR
COLOR UR: YELLOW
GLUCOSE UR STRIP-MCNC: NEGATIVE MG/DL
KETONES UR QL: NEGATIVE
LEUKOCYTE EST, POC: ABNORMAL
NITRITE UR-MCNC: NEGATIVE MG/ML
PH UR: 6 [PH] (ref 5–8)
PROT UR STRIP-MCNC: NEGATIVE MG/DL
RBC # UR STRIP: NEGATIVE /UL
SP GR UR: 1.02 (ref 1–1.03)
UROBILINOGEN UR QL: NORMAL

## 2025-04-17 RX ORDER — METRONIDAZOLE 500 MG/1
500 TABLET ORAL 2 TIMES DAILY
Qty: 14 TABLET | Refills: 0 | Status: SHIPPED | OUTPATIENT
Start: 2025-04-17 | End: 2025-04-24

## 2025-04-17 NOTE — PROGRESS NOTES
CC- vaginal burning and itching       Brittny Hill is a 30 y.o.  38w0d patient being seen today for vaginal itching and burning that started 2 days ago. Denies urinary symptoms. Reports she is losing mucous plug but no white discharge. Pt denies any changes to soaps, detergents, lotions. She has not tried using anything OTC for symptoms. She reports having increased vaginal pressure, wanting cervical check today.     Her prenatal care is complicated by (and status) :    Patient Active Problem List   Diagnosis    Retroverted uterus    Pregnancy    Nausea/vomiting in pregnancy    Mother positive for group B Streptococcus colonization    Refuses tetanus, diphtheria, and acellular pertussis (Tdap) vaccination    History of precipitous delivery         Ultrasound Today: No.    ROS -   Patient Reports :  vaginal itching and burning  Patient Denies: Loss of Fluid, Vaginal Spotting, Vision Changes, Headaches, Contractions, Epigastric pain, and skin itching  Fetal Movement : normal  All other systems reviewed and are negative.       The additional following portions of the patient's history were reviewed and updated as appropriate: allergies, current medications, past family history, past medical history, past social history, past surgical history, and problem list.    I have reviewed and agree with the HPI, ROS, and historical information as entered above. Hilda Aguilar, APRN      /82   Wt 77.2 kg (170 lb 3.2 oz)   LMP 2024   BMI 27.47 kg/m²       EXAM:     Prenatal Vitals  BP: 124/82  Weight: 77.2 kg (170 lb 3.2 oz)   Fetal Heart Rate: 145   Dilation/Effacement/Station  Dilation: Fingertip       Vaginal exam, white discharge present, no bleeding, no LOF.      Assessment and Plan    Problem List Items Addressed This Visit    None  Visit Diagnoses         Prenatal care, antepartum, unspecified     -  Primary    Relevant Orders    POC Urinalysis Dipstick (Completed)      Abnormal  urinalysis        Relevant Orders    Urine Culture - Urine, Urine, Clean Catch      Acute vaginitis        Relevant Orders    NuSwab VG, Candida 6sp - Swab, Cervix, Vagina (Completed)            Pregnancy at 38w0d  Fetal status reassuring.   Vaginal culture obtained. Will treat for BV due to clue cells on wet prep.   Clue cells seen on wet prep, treat with metronidazole   Reviewed vaginal hygiene to prevent reoccurance.  Labor precautions reviewed.  Increase PO fluids  Return for regularly scheduled OB appointment.  RTC for worsening symptoms  Return for Next scheduled follow up.    SUZI Ruffin  04/17/2025   A portion of the care provided today was to address vaginitis, which is outside the scope of routine prenatal care.

## 2025-04-21 LAB
A VAGINAE DNA VAG QL NAA+PROBE: ABNORMAL SCORE
BVAB2 DNA VAG QL NAA+PROBE: ABNORMAL SCORE
C ALBICANS DNA VAG QL NAA+PROBE: POSITIVE
C GLABRATA DNA VAG QL NAA+PROBE: NEGATIVE
C KRUSEI DNA VAG QL NAA+PROBE: NEGATIVE
C LUSITANIAE DNA VAG QL NAA+PROBE: NEGATIVE
CANDIDA DNA VAG QL NAA+PROBE: NEGATIVE
MEGA1 DNA VAG QL NAA+PROBE: ABNORMAL SCORE
T VAGINALIS DNA VAG QL NAA+PROBE: NEGATIVE

## 2025-04-22 ENCOUNTER — TELEPHONE (OUTPATIENT)
Dept: OBSTETRICS AND GYNECOLOGY | Facility: CLINIC | Age: 30
End: 2025-04-22

## 2025-04-22 NOTE — TELEPHONE ENCOUNTER
"Pt notified of nuswab results, per SVETLANA Aguilar, APRN: \"Yeast positive, can do monistat over the counter for 7 days.\" Pt v/u.   "

## 2025-04-23 ENCOUNTER — ROUTINE PRENATAL (OUTPATIENT)
Dept: OBSTETRICS AND GYNECOLOGY | Facility: CLINIC | Age: 30
End: 2025-04-23
Payer: MEDICAID

## 2025-04-23 VITALS — DIASTOLIC BLOOD PRESSURE: 72 MMHG | SYSTOLIC BLOOD PRESSURE: 110 MMHG | BODY MASS INDEX: 27.18 KG/M2 | WEIGHT: 168.4 LBS

## 2025-04-23 DIAGNOSIS — Z3A.38 38 WEEKS GESTATION OF PREGNANCY: Primary | ICD-10-CM

## 2025-04-23 DIAGNOSIS — Z87.59 HISTORY OF PRECIPITOUS DELIVERY: ICD-10-CM

## 2025-04-23 DIAGNOSIS — Z28.21 REFUSES TETANUS, DIPHTHERIA, AND ACELLULAR PERTUSSIS (TDAP) VACCINATION: ICD-10-CM

## 2025-04-23 LAB
GLUCOSE UR STRIP-MCNC: NEGATIVE MG/DL
PROT UR STRIP-MCNC: NEGATIVE MG/DL

## 2025-04-28 ENCOUNTER — TELEPHONE (OUTPATIENT)
Dept: OBSTETRICS AND GYNECOLOGY | Facility: CLINIC | Age: 30
End: 2025-04-28
Payer: MEDICAID

## 2025-04-28 NOTE — TELEPHONE ENCOUNTER
Patient of Dr. Holm;  @ 39w 4d. LOV 25; NOV 25.  Returned patient's call.   States she has been having mucus discharge for the last several days; today the mucus discharge is bloody.  Reports having intermittent episodes of irregular contractions.   Denies any leaking fluid.   Reports normal fetal movement.   MBT is O positive.   Last intercourse was last week. She has been doing some pelvic exercises with an exercise ball to try to stimulate labor.   Discussed with SUZI Cao. She recommends patient monitor and call or go to L&D for active bleeding, leaking fluid, decreased fetal movement, or onset of labor. Patient v/u and agreed.

## 2025-04-30 ENCOUNTER — HOSPITAL ENCOUNTER (INPATIENT)
Facility: HOSPITAL | Age: 30
LOS: 1 days | Discharge: HOME | End: 2025-05-01
Attending: OBSTETRICS & GYNECOLOGY | Admitting: OBSTETRICS & GYNECOLOGY
Payer: MEDICAID

## 2025-04-30 LAB
ABO GROUP BLD: NORMAL
ALP SERPL-CCNC: 136 U/L (ref 39–117)
ALT SERPL W P-5'-P-CCNC: 6 U/L (ref 1–33)
AST SERPL-CCNC: 17 U/L (ref 1–32)
BILIRUB SERPL-MCNC: 0.2 MG/DL (ref 0–1.2)
BLD GP AB SCN SERPL QL: NEGATIVE
CREAT SERPL-MCNC: 0.54 MG/DL (ref 0.57–1)
DEPRECATED RDW RBC AUTO: 40.8 FL (ref 37–54)
ERYTHROCYTE [DISTWIDTH] IN BLOOD BY AUTOMATED COUNT: 14.6 % (ref 12.3–15.4)
HCT VFR BLD AUTO: 34.7 % (ref 34–46.6)
HGB BLD-MCNC: 10.8 G/DL (ref 12–15.9)
LDH SERPL-CCNC: 185 U/L (ref 135–214)
MCH RBC QN AUTO: 24.3 PG (ref 26.6–33)
MCHC RBC AUTO-ENTMCNC: 31.1 G/DL (ref 31.5–35.7)
MCV RBC AUTO: 78 FL (ref 79–97)
PLATELET # BLD AUTO: 203 10*3/MM3 (ref 140–450)
PMV BLD AUTO: 10.1 FL (ref 6–12)
RBC # BLD AUTO: 4.45 10*6/MM3 (ref 3.77–5.28)
RH BLD: POSITIVE
T&S EXPIRATION DATE: NORMAL
TREPONEMA PALLIDUM IGG+IGM AB [PRESENCE] IN SERUM OR PLASMA BY IMMUNOASSAY: NORMAL
URATE SERPL-MCNC: 4.1 MG/DL (ref 2.4–5.7)
WBC NRBC COR # BLD AUTO: 8.71 10*3/MM3 (ref 3.4–10.8)

## 2025-04-30 PROCEDURE — 84450 TRANSFERASE (AST) (SGOT): CPT | Performed by: OBSTETRICS & GYNECOLOGY

## 2025-04-30 PROCEDURE — 82247 BILIRUBIN TOTAL: CPT | Performed by: OBSTETRICS & GYNECOLOGY

## 2025-04-30 PROCEDURE — 84075 ASSAY ALKALINE PHOSPHATASE: CPT | Performed by: OBSTETRICS & GYNECOLOGY

## 2025-04-30 PROCEDURE — 59025 FETAL NON-STRESS TEST: CPT

## 2025-04-30 PROCEDURE — 84460 ALANINE AMINO (ALT) (SGPT): CPT | Performed by: OBSTETRICS & GYNECOLOGY

## 2025-04-30 PROCEDURE — 82565 ASSAY OF CREATININE: CPT | Performed by: OBSTETRICS & GYNECOLOGY

## 2025-04-30 PROCEDURE — 86780 TREPONEMA PALLIDUM: CPT | Performed by: OBSTETRICS & GYNECOLOGY

## 2025-04-30 PROCEDURE — 25010000002 PENICILLIN G POTASSIUM PER 600000 UNITS: Performed by: OBSTETRICS & GYNECOLOGY

## 2025-04-30 PROCEDURE — 85027 COMPLETE CBC AUTOMATED: CPT | Performed by: OBSTETRICS & GYNECOLOGY

## 2025-04-30 PROCEDURE — 36415 COLL VENOUS BLD VENIPUNCTURE: CPT | Performed by: OBSTETRICS & GYNECOLOGY

## 2025-04-30 PROCEDURE — 86850 RBC ANTIBODY SCREEN: CPT | Performed by: OBSTETRICS & GYNECOLOGY

## 2025-04-30 PROCEDURE — 83615 LACTATE (LD) (LDH) ENZYME: CPT | Performed by: OBSTETRICS & GYNECOLOGY

## 2025-04-30 PROCEDURE — 86901 BLOOD TYPING SEROLOGIC RH(D): CPT | Performed by: OBSTETRICS & GYNECOLOGY

## 2025-04-30 PROCEDURE — 25810000003 LACTATED RINGERS PER 1000 ML: Performed by: OBSTETRICS & GYNECOLOGY

## 2025-04-30 PROCEDURE — 59410 OBSTETRICAL CARE: CPT | Performed by: OBSTETRICS & GYNECOLOGY

## 2025-04-30 PROCEDURE — 84550 ASSAY OF BLOOD/URIC ACID: CPT | Performed by: OBSTETRICS & GYNECOLOGY

## 2025-04-30 PROCEDURE — 86900 BLOOD TYPING SEROLOGIC ABO: CPT | Performed by: OBSTETRICS & GYNECOLOGY

## 2025-04-30 RX ORDER — METHYLERGONOVINE MALEATE 0.2 MG/ML
200 INJECTION INTRAVENOUS ONCE AS NEEDED
Status: DISCONTINUED | OUTPATIENT
Start: 2025-04-30 | End: 2025-05-01 | Stop reason: HOSPADM

## 2025-04-30 RX ORDER — DIPHENHYDRAMINE HCL 25 MG
25 CAPSULE ORAL NIGHTLY PRN
Status: DISCONTINUED | OUTPATIENT
Start: 2025-04-30 | End: 2025-05-01 | Stop reason: HOSPADM

## 2025-04-30 RX ORDER — SODIUM CHLORIDE 0.9 % (FLUSH) 0.9 %
10 SYRINGE (ML) INJECTION AS NEEDED
Status: DISCONTINUED | OUTPATIENT
Start: 2025-04-30 | End: 2025-05-01

## 2025-04-30 RX ORDER — PROMETHAZINE HYDROCHLORIDE 25 MG/1
25 TABLET ORAL EVERY 6 HOURS PRN
Status: DISCONTINUED | OUTPATIENT
Start: 2025-04-30 | End: 2025-05-01 | Stop reason: HOSPADM

## 2025-04-30 RX ORDER — METHYLERGONOVINE MALEATE 0.2 MG/ML
200 INJECTION INTRAVENOUS ONCE AS NEEDED
Status: DISCONTINUED | OUTPATIENT
Start: 2025-04-30 | End: 2025-04-30 | Stop reason: HOSPADM

## 2025-04-30 RX ORDER — OXYTOCIN/0.9 % SODIUM CHLORIDE 30/500 ML
250 PLASTIC BAG, INJECTION (ML) INTRAVENOUS CONTINUOUS
Status: ACTIVE | OUTPATIENT
Start: 2025-04-30 | End: 2025-04-30

## 2025-04-30 RX ORDER — OXYTOCIN/0.9 % SODIUM CHLORIDE 30/500 ML
125 PLASTIC BAG, INJECTION (ML) INTRAVENOUS ONCE AS NEEDED
Status: DISCONTINUED | OUTPATIENT
Start: 2025-04-30 | End: 2025-05-01 | Stop reason: HOSPADM

## 2025-04-30 RX ORDER — DOCUSATE SODIUM 100 MG/1
100 CAPSULE, LIQUID FILLED ORAL 2 TIMES DAILY
Status: DISCONTINUED | OUTPATIENT
Start: 2025-04-30 | End: 2025-05-01 | Stop reason: HOSPADM

## 2025-04-30 RX ORDER — IBUPROFEN 600 MG/1
600 TABLET, FILM COATED ORAL EVERY 6 HOURS PRN
Status: DISCONTINUED | OUTPATIENT
Start: 2025-04-30 | End: 2025-05-01 | Stop reason: HOSPADM

## 2025-04-30 RX ORDER — HYDROCORTISONE 25 MG/G
1 CREAM TOPICAL AS NEEDED
Status: DISCONTINUED | OUTPATIENT
Start: 2025-04-30 | End: 2025-05-01 | Stop reason: HOSPADM

## 2025-04-30 RX ORDER — MISOPROSTOL 200 UG/1
800 TABLET ORAL ONCE AS NEEDED
Status: DISCONTINUED | OUTPATIENT
Start: 2025-04-30 | End: 2025-04-30 | Stop reason: HOSPADM

## 2025-04-30 RX ORDER — PROMETHAZINE HYDROCHLORIDE 12.5 MG/1
12.5 SUPPOSITORY RECTAL EVERY 6 HOURS PRN
Status: DISCONTINUED | OUTPATIENT
Start: 2025-04-30 | End: 2025-05-01 | Stop reason: HOSPADM

## 2025-04-30 RX ORDER — HYDROCODONE BITARTRATE AND ACETAMINOPHEN 10; 325 MG/1; MG/1
1 TABLET ORAL EVERY 4 HOURS PRN
Status: DISCONTINUED | OUTPATIENT
Start: 2025-04-30 | End: 2025-05-01 | Stop reason: HOSPADM

## 2025-04-30 RX ORDER — ACETAMINOPHEN 325 MG/1
650 TABLET ORAL EVERY 4 HOURS PRN
Status: DISCONTINUED | OUTPATIENT
Start: 2025-04-30 | End: 2025-04-30 | Stop reason: HOSPADM

## 2025-04-30 RX ORDER — CARBOPROST TROMETHAMINE 250 UG/ML
250 INJECTION, SOLUTION INTRAMUSCULAR
Status: DISCONTINUED | OUTPATIENT
Start: 2025-04-30 | End: 2025-04-30 | Stop reason: HOSPADM

## 2025-04-30 RX ORDER — BISACODYL 10 MG
10 SUPPOSITORY, RECTAL RECTAL DAILY PRN
Status: DISCONTINUED | OUTPATIENT
Start: 2025-05-01 | End: 2025-05-01 | Stop reason: HOSPADM

## 2025-04-30 RX ORDER — SODIUM CHLORIDE, SODIUM LACTATE, POTASSIUM CHLORIDE, CALCIUM CHLORIDE 600; 310; 30; 20 MG/100ML; MG/100ML; MG/100ML; MG/100ML
125 INJECTION, SOLUTION INTRAVENOUS CONTINUOUS
Status: DISCONTINUED | OUTPATIENT
Start: 2025-04-30 | End: 2025-05-01

## 2025-04-30 RX ORDER — SODIUM CHLORIDE 0.9 % (FLUSH) 0.9 %
1-10 SYRINGE (ML) INJECTION AS NEEDED
Status: DISCONTINUED | OUTPATIENT
Start: 2025-04-30 | End: 2025-05-01 | Stop reason: HOSPADM

## 2025-04-30 RX ORDER — LIDOCAINE HYDROCHLORIDE 10 MG/ML
0.5 INJECTION, SOLUTION EPIDURAL; INFILTRATION; INTRACAUDAL; PERINEURAL ONCE AS NEEDED
Status: DISCONTINUED | OUTPATIENT
Start: 2025-04-30 | End: 2025-04-30 | Stop reason: HOSPADM

## 2025-04-30 RX ORDER — HYDROCODONE BITARTRATE AND ACETAMINOPHEN 5; 325 MG/1; MG/1
1 TABLET ORAL EVERY 4 HOURS PRN
Status: DISCONTINUED | OUTPATIENT
Start: 2025-04-30 | End: 2025-05-01 | Stop reason: HOSPADM

## 2025-04-30 RX ORDER — PENICILLIN G 3000000 [IU]/50ML
3 INJECTION, SOLUTION INTRAVENOUS EVERY 4 HOURS
Status: DISCONTINUED | OUTPATIENT
Start: 2025-04-30 | End: 2025-04-30 | Stop reason: HOSPADM

## 2025-04-30 RX ORDER — MAGNESIUM CARB/ALUMINUM HYDROX 105-160MG
30 TABLET,CHEWABLE ORAL ONCE
Status: COMPLETED | OUTPATIENT
Start: 2025-04-30 | End: 2025-04-30

## 2025-04-30 RX ORDER — OXYTOCIN/0.9 % SODIUM CHLORIDE 30/500 ML
999 PLASTIC BAG, INJECTION (ML) INTRAVENOUS ONCE
Status: DISCONTINUED | OUTPATIENT
Start: 2025-04-30 | End: 2025-05-01 | Stop reason: HOSPADM

## 2025-04-30 RX ORDER — ACETAMINOPHEN 325 MG/1
650 TABLET ORAL EVERY 6 HOURS PRN
Status: DISCONTINUED | OUTPATIENT
Start: 2025-04-30 | End: 2025-05-01 | Stop reason: HOSPADM

## 2025-04-30 RX ORDER — OXYTOCIN/0.9 % SODIUM CHLORIDE 30/500 ML
PLASTIC BAG, INJECTION (ML) INTRAVENOUS
Status: DISCONTINUED
Start: 2025-04-30 | End: 2025-05-01 | Stop reason: HOSPADM

## 2025-04-30 RX ORDER — SODIUM CHLORIDE 0.9 % (FLUSH) 0.9 %
10 SYRINGE (ML) INJECTION EVERY 12 HOURS SCHEDULED
Status: DISCONTINUED | OUTPATIENT
Start: 2025-04-30 | End: 2025-05-01

## 2025-04-30 RX ADMIN — ACETAMINOPHEN 650 MG: 325 TABLET ORAL at 21:53

## 2025-04-30 RX ADMIN — SODIUM CHLORIDE, POTASSIUM CHLORIDE, SODIUM LACTATE AND CALCIUM CHLORIDE 125 ML/HR: 600; 310; 30; 20 INJECTION, SOLUTION INTRAVENOUS at 08:19

## 2025-04-30 RX ADMIN — Medication: at 15:51

## 2025-04-30 RX ADMIN — SODIUM CHLORIDE, POTASSIUM CHLORIDE, SODIUM LACTATE AND CALCIUM CHLORIDE 125 ML/HR: 600; 310; 30; 20 INJECTION, SOLUTION INTRAVENOUS at 03:59

## 2025-04-30 RX ADMIN — BENZOCAINE 1 APPLICATION: 5.6 OINTMENT TOPICAL at 15:51

## 2025-04-30 RX ADMIN — ACETAMINOPHEN 650 MG: 325 TABLET ORAL at 10:39

## 2025-04-30 RX ADMIN — DOCUSATE SODIUM 100 MG: 100 CAPSULE, LIQUID FILLED ORAL at 21:32

## 2025-04-30 RX ADMIN — SODIUM CHLORIDE 5 MILLION UNITS: 900 INJECTION INTRAVENOUS at 04:00

## 2025-04-30 RX ADMIN — PENICILLIN G 3 MILLION UNITS: 3000000 INJECTION, SOLUTION INTRAVENOUS at 08:20

## 2025-04-30 RX ADMIN — MINERAL OIL 30 ML: 1000 SOLUTION ORAL at 04:00

## 2025-04-30 RX ADMIN — WITCH HAZEL 1 PAD: 500 SOLUTION RECTAL; TOPICAL at 15:51

## 2025-04-30 NOTE — L&D DELIVERY NOTE
" [facility]   Vaginal Delivery Note    Patient Name: Brittny Hill  : 1995  MRN: [id number]    Date of Delivery: 2025    Diagnosis     Pre & Post-Delivery:  Intrauterine pregnancy at 39w6d  Labor status:      Pregnancy    Mother positive for group B Streptococcus colonization    History of precipitous delivery             Problem List    Transfer to Postpartum     Review the Delivery Report for details.     Delivery     Delivery: Vaginal, Spontaneous    YOB: 2024   Time of Birth:  Gestational Age 10:06 AM  39w6d     Anesthesia: None    Delivering clinician: Brandon Holm   Forceps?   No   Vacuum? No    Shoulder dystocia present: No        Delivery narrative: Patient declined epidural.  Controlled delivery of vertex without episiotomy.  Nuchal cord x 1 was loose and reduced.  Atraumatic delivery of shoulders.  Placenta expressed intact.  No lacerations.        Infant     Findings: female infant     Infant observations: Weight: 3150 g (6 lb 15.1 oz)  Length: 18 in  Observations/Comments:        Apgars:   @ 1 minute /      @ 5 minutes   Infant Name:      Placenta & Cord         Placenta delivered  Spontaneous at   2025 10:10 AM    Cord:   present.   Nuchal Cord?  yes; Number of nuchal loops present:  1   Cord blood obtained: Yes   Cord gases obtained:  No   Cord gas results: Venous:  No results found for: \"PHCVEN\", \"BECVEN\"    Arterial:  No results found for: \"PHCART\", \"BECART\"     Repair     Episiotomy: None    No    Lacerations: No   Estimated Blood Loss:       Quantitative Blood Loss:     200 mL   TOTAL BLOOD LOSS : 0 mL (2025  2:50 AM - 2025 11:13 AM)  Complications     none    Disposition     Mother to Mother Baby/Postpartum  in stable condition currently.  Baby to NBN  in stable condition currently.    Brandon Holm MD  25  11:13 EDT        "

## 2025-04-30 NOTE — H&P
"History and Physical  [facility]    Chief Complaint   Patient presents with    Contractions         Pregnancy    Mother positive for group B Streptococcus colonization    History of precipitous delivery       Brittny Hill is a 30 y.o. year old  with an Estimated Date of Delivery: 25 currently at 39w6d presenting with regular contractions, normal fetal movement, no leaking, and no vaginal bleeding.    Prenatal care has been with Dr. Galaviz.  It has been significant for  positive group B strep screen. .  Active Hospital Problems    Diagnosis     History of precipitous delivery     Mother positive for group B Streptococcus colonization     Pregnancy         Patient declines induction of labor.    The following portions of the patient's history were reviewed and updated as appropriate:vital signs, allergies, current medications, past medical history, past social history, past surgical history, and problem list.    Review of Systems  Pertinent items are noted in HPI.     Objective     /70   Pulse 89   Temp 98.1 °F (36.7 °C) (Oral)   Resp 18   Ht 167.6 cm (66\")   LMP 2024   Breastfeeding Yes   BMI 27.18 kg/m²     Physical Exam    General:  well developed; well nourished  mentation appropriate           Abdomen: soft, non-tender; no masses  fundus firm and non-tender       FHT's: reactive and category 1   Cervix: 5 cm dilated, 70 effaced, -2 station   [place]: Contraction are regular     Lab Review   Labs: CBC   Lab Results (last 24 hours)       Procedure Component Value Units Date/Time    Preeclampsia Panel [[id number]]  (Abnormal) Collected: 25 0341    Specimen: Blood Updated: 25 0415     Alkaline Phosphatase 136 U/L      ALT (SGPT) 6 U/L      AST (SGOT) 17 U/L      Creatinine 0.54 mg/dL      Total Bilirubin 0.2 mg/dL       U/L      Comment: Specimen hemolyzed.  Results may be affected.        Uric Acid 4.1 mg/dL     CBC (No Diff) [[id number]]  (Abnormal) " Collected: 25    Specimen: Blood Updated: 25     WBC 8.71 10*3/mm3      RBC 4.45 10*6/mm3      Hemoglobin 10.8 g/dL      Hematocrit 34.7 %      MCV 78.0 fL      MCH 24.3 pg      MCHC 31.1 g/dL      RDW 14.6 %      RDW-SD 40.8 fl      MPV 10.1 fL      Platelets 203 10*3/mm3     Treponema pallidum AB w/Reflex RPR [[id number]] Collected: 25    Specimen: Blood Updated: 25            Imaging   No data reviewed   Imaging Results (Most Recent)       None          Assessment & Plan     ASSESSMENT  IUP at 39w6d  Active Hospital Problems    Diagnosis     History of precipitous delivery      Did not have short labor.  Just short second stage labor.  Discussed option of induction at 39 weeks.      Mother positive for group B Streptococcus colonization      Prenatal urine culture was positive for group B strep.  Rx for amoxicillin 500 mg 3 times daily for 7 days sent to pharmacy.  Will need group B strep prophylaxis in labor.    3/31/2025; Group B strep screen was positive.      Pregnancy      29-year-old   Dates consistent with 8-week 5-day ultrasound.  cfDNA- Negative, Male  Required progesterone supplementation with previous pregnancy  History of precipitous delivery.  Declines IOL.  U/s 36 wk EFW 49% AC 77%.     History of precipitous labor.  Group B strep screen positive.    PLAN  Spontaneous labor.  Declines AROM at this time.         Brandon Holm MD  508:55 EDT

## 2025-04-30 NOTE — LACTATION NOTE
25 1617   Maternal Information   Date of Referral 25   Person Making Referral lactation consultant  (courtesy visit; newly postpartum)   Maternal Reason for Referral other (see comments)  (3rd time mother; nursed 1st child for 6 months; 2nd child for 2 years (now 4 years old); stated current infant latched well after delivery)   Infant Reason for Referral  infant   Maternal Assessment   Breast Size Issue none   Breast Shape Bilateral:;round   Breast Density Bilateral:;soft   Nipples Bilateral:;everted   Left Nipple Symptoms intact;nontender   Right Nipple Symptoms intact;nontender   Maternal Infant Feeding   Maternal Emotional State receptive;relaxed   Infant Positioning cradle;cross-cradle  (left)   Signs of Milk Transfer audible swallow;deep jaw excursions noted   Pain with Feeding no   Comfort Measures Before/During Feeding latch adjusted;infant position adjusted  (switched from cradle to cross cradle to assist with more infant neck support and infant coming into breast and not in an asymmetrical fashion)   Latch Assistance verbal guidance offered;minimal assistance   Support Person Involvement verbally supports mother   Milk Expression/Equipment   Breast Pump Type double electric, personal  (Spectra)   Equipment for Home Use pump not needed at this time   Breast Pumping   Breast Pumping Interventions other (see comments)  (for short/missed feedings, if supplementation used)   Lactation Referrals   Lactation Referrals outpatient lactation program   Outpatient Lactation Program Lactation Follow-up Date/Time prn after discharge     Courtesy visit; newly postpartum couplet; infant nursed well after delivery.    Infant in a quiet alert stated and showing feeding cue.  Offered latch assistance and mother accepted.  Mother placed infant in L cradle hold, but infant was coming into breast straight on.  Encouraged mother to switch arms and switch to cross cradle for greater infant neck support.   Mother did so.  Infant had a gape response and latched.  Then had mother adjust to achieve an asymmetrical latch.  Mother flared lower lip.  Good latch noted.  Audible swallow.  Provided education.  Encouraged every three hour feedings along with feeding cues.  Encouraged to offer second breast and to call lactation PRN.

## 2025-05-01 VITALS
TEMPERATURE: 98.1 F | HEIGHT: 66 IN | RESPIRATION RATE: 16 BRPM | HEART RATE: 80 BPM | DIASTOLIC BLOOD PRESSURE: 67 MMHG | BODY MASS INDEX: 27.18 KG/M2 | SYSTOLIC BLOOD PRESSURE: 116 MMHG

## 2025-05-01 LAB
BASOPHILS # BLD AUTO: 0.02 10*3/MM3 (ref 0–0.2)
BASOPHILS NFR BLD AUTO: 0.2 % (ref 0–1.5)
DEPRECATED RDW RBC AUTO: 41.8 FL (ref 37–54)
EOSINOPHIL # BLD AUTO: 0.11 10*3/MM3 (ref 0–0.4)
EOSINOPHIL NFR BLD AUTO: 1.3 % (ref 0.3–6.2)
ERYTHROCYTE [DISTWIDTH] IN BLOOD BY AUTOMATED COUNT: 14.6 % (ref 12.3–15.4)
HCT VFR BLD AUTO: 27.5 % (ref 34–46.6)
HGB BLD-MCNC: 8.3 G/DL (ref 12–15.9)
IMM GRANULOCYTES # BLD AUTO: 0.07 10*3/MM3 (ref 0–0.05)
IMM GRANULOCYTES NFR BLD AUTO: 0.8 % (ref 0–0.5)
LYMPHOCYTES # BLD AUTO: 1.92 10*3/MM3 (ref 0.7–3.1)
LYMPHOCYTES NFR BLD AUTO: 21.9 % (ref 19.6–45.3)
MCH RBC QN AUTO: 24 PG (ref 26.6–33)
MCHC RBC AUTO-ENTMCNC: 30.2 G/DL (ref 31.5–35.7)
MCV RBC AUTO: 79.5 FL (ref 79–97)
MONOCYTES # BLD AUTO: 0.78 10*3/MM3 (ref 0.1–0.9)
MONOCYTES NFR BLD AUTO: 8.9 % (ref 5–12)
NEUTROPHILS NFR BLD AUTO: 5.86 10*3/MM3 (ref 1.7–7)
NEUTROPHILS NFR BLD AUTO: 66.9 % (ref 42.7–76)
NRBC BLD AUTO-RTO: 0 /100 WBC (ref 0–0.2)
PLATELET # BLD AUTO: 184 10*3/MM3 (ref 140–450)
PMV BLD AUTO: 10.3 FL (ref 6–12)
RBC # BLD AUTO: 3.46 10*6/MM3 (ref 3.77–5.28)
WBC NRBC COR # BLD AUTO: 8.76 10*3/MM3 (ref 3.4–10.8)

## 2025-05-01 PROCEDURE — 85025 COMPLETE CBC W/AUTO DIFF WBC: CPT | Performed by: OBSTETRICS & GYNECOLOGY

## 2025-05-01 RX ADMIN — DOCUSATE SODIUM 100 MG: 100 CAPSULE, LIQUID FILLED ORAL at 08:34

## 2025-05-01 RX ADMIN — IBUPROFEN 600 MG: 600 TABLET, FILM COATED ORAL at 05:08

## 2025-05-01 RX ADMIN — Medication 1 APPLICATION: at 05:21

## 2025-05-01 NOTE — DISCHARGE SUMMARY
Discharge Summary    Date of Admission: 2025  Date of Discharge:  2025      Patient: Brittny Hill      MR#:[id number]    Delivery Provider: Brandon Holm    Attending Provider: Brandon Holm MD    Presenting Problem/History of Present Illness  Pregnancy [Z34.90]       Spontaneous vaginal delivery    Pregnancy    Mother positive for group B Streptococcus colonization    History of precipitous delivery         Discharge Diagnosis: Vaginal delivery at 39w6d    Procedures:  Vaginal, Spontaneous    2025   10:06 AM       Discharge Date: 2025;     Hospital Course  Patient is a 30 y.o. female  at 39w6d status post vaginal delivery without complication. Postpartum the patient did well. She remained afebrile, with vital signs stable. She was ready for discharge on postpartum day 2.     Infant:   female fetus 3150 g (6 lb 15.1 oz) with Apgar scores of 9 , 9  at five minutes.    Condition on Discharge:  Stable    Vital Signs  Temp:  [97.9 °F (36.6 °C)-98.4 °F (36.9 °C)] 98.1 °F (36.7 °C)  Heart Rate:  [68-82] 80  Resp:  [16] 16  BP: (111-138)/(55-71) 116/67    Lab Results   Component Value Date    WBC 8.76 2025    HGB 8.3 (L) 2025    HCT 27.5 (L) 2025    MCV 79.5 2025     2025       Discharge Disposition  Home or Self Care    Discharge Medications     Discharge Medications        Continue These Medications        Instructions Start Date   prenatal vitamins 29-1 MG tablet tablet   1 tablet, Oral, Daily      Slow Iron 160 (50 Fe) MG tablet controlled-release  Generic drug: Ferrous Sulfate Dried ER   1 tablet, Oral, Daily             ASK your doctor about these medications        Instructions Start Date   Ventolin  (90 Base) MCG/ACT inhaler  Generic drug: albuterol sulfate HFA   1 puff               Discharge Diet:   Diet Instructions       Diet: Regular/House Diet; Thin (IDDSI 0)      Discharge Diet: Regular/House Diet    Fluid Consistency:  Thin (IDDSI 0)            Activity at Discharge:   Activity Instructions       Driving Restrictions      Type of Restriction:  Bathing  Sex       Explain Sexual Activity Restrictions: 6 weeks    Bathing Restrictions: No Tub Bath    Pelvic Rest              Follow-up Appointments  Future Appointments   Date Time Provider Department Center   5/2/2025 11:30 AM [facility]PP[facility] PELAYO [address] [facility]   5/2/2025 11:45 AM Brandon Holm MD [facility]     Additional Instructions for the Follow-ups that You Need to Schedule       Call MD With Problems / Concerns   As directed      Instructions: Monitor for excessive bleeding >1 pad/hr for several hours, dizziness, syncope, shortness of breath, chest pain, fever or changes in blood pressure. Call or go to ED with foul smelling discharge, fever of >100.4, signs of postpartum depression, saturating a pad in <1 hour, blood clots larger than a golf ball. Also, call with headache that does not resolve with medication or rest, vision changes, pain in right upper quadrant, worsening swelling, or BP >140/90 if able to monitor at home.    Order Comments: Instructions: Monitor for excessive bleeding >1 pad/hr for several hours, dizziness, syncope, shortness of breath, chest pain, fever or changes in blood pressure. Call or go to ED with foul smelling discharge, fever of >100.4, signs of postpartum depression, saturating a pad in <1 hour, blood clots larger than a golf ball. Also, call with headache that does not resolve with medication or rest, vision changes, pain in right upper quadrant, worsening swelling, or BP >140/90 if able to monitor at home.         Discharge Follow-up with Specified Provider: ; 6 Weeks   As directed      To:    Follow Up: 6 Weeks              Ok for early dc per MAURI Kulkarni, SUZI  05/01/25  10:17 EDT  Csd

## 2025-05-02 ENCOUNTER — MATERNAL SCREENING (OUTPATIENT)
Dept: CALL CENTER | Facility: HOSPITAL | Age: 30
End: 2025-05-02
Payer: MEDICAID

## 2025-05-02 NOTE — OUTREACH NOTE
Maternal Screening Survey      Flowsheet Row Responses   Eligibility Eligible   Prep survey completed? Yes   Facility patient discharged from? [facility] - Registered Nurse

## 2025-05-12 ENCOUNTER — MATERNAL SCREENING (OUTPATIENT)
Dept: CALL CENTER | Facility: HOSPITAL | Age: 30
End: 2025-05-12
Payer: MEDICAID

## 2025-05-12 NOTE — OUTREACH NOTE
Maternal Screening Survey      Flowsheet Row Responses   Facility patient discharged from? [facility]   Attempt successful? No   Unsuccessful attempts Attempt 1              [facility] - Registered Nurse

## 2025-05-12 NOTE — OUTREACH NOTE
Maternal Screening Survey      Flowsheet Row Responses   Facility patient discharged from? [facility]   Attempt successful? Yes   Call start time    Call end time    I have been able to laugh and see the funny side of things. 0   I have looked forward with enjoyment to things. 0   I have blamed myself unnecessarily when things went wrong. 0   I have been anxious or worried for no good reason. 0   I have felt scared or panicky for no good reason. 0   Things have been getting on top of me. 0   I have been so unhappy that I have had difficulty sleeping. 0   I have felt sad or miserable. 0   I have been so unhappy that I have been crying. 0   The thought of harming myself has occurred to me. 0   [place]  Depression Scale Total 0   Did any of your parents have problems with alcohol or drug use? No   Do any of your peers have problems with alcohol or drug use? No   Does your partner have problems with alcohol or drug use? No   Before you were pregnant did you have problems with alcohol or drug use? (past) No   In the past month, did you drink beer, wine, liquor or use any other drugs? (pregnancy) No   Maternal Screening call completed Yes   Call end time               Honey MCLEAN - Registered Nurse

## 2025-06-30 ENCOUNTER — POSTPARTUM VISIT (OUTPATIENT)
Dept: OBSTETRICS AND GYNECOLOGY | Facility: CLINIC | Age: 30
End: 2025-06-30
Payer: MEDICAID

## 2025-06-30 VITALS
HEIGHT: 66 IN | BODY MASS INDEX: 25.2 KG/M2 | SYSTOLIC BLOOD PRESSURE: 116 MMHG | DIASTOLIC BLOOD PRESSURE: 72 MMHG | WEIGHT: 156.8 LBS

## 2025-06-30 DIAGNOSIS — Z12.4 PAPANICOLAOU SMEAR: ICD-10-CM

## 2025-06-30 DIAGNOSIS — Z78.9 BREASTFEEDING (INFANT): ICD-10-CM

## 2025-06-30 PROCEDURE — 0503F POSTPARTUM CARE VISIT: CPT

## 2025-06-30 RX ORDER — PRENATAL VIT,CAL 76/IRON/FOLIC 29 MG-1 MG
1 TABLET ORAL DAILY
Qty: 30 TABLET | Refills: 11 | Status: SHIPPED | OUTPATIENT
Start: 2025-06-30 | End: 2026-06-30

## 2025-06-30 NOTE — PROGRESS NOTES
"        Chief Complaint   Patient presents with    Postpartum Care       Postpartum Visit         Brittny Hill is a 30 y.o.  who presents today for a 2 month(s) postpartum check.     C/S: no     This patient has no babies on file. This patient has no babies on file.       Baby Discharged: Discharged with Mom  Delivering Physician: Brandon Holm MD    Her pregnancy was complicated by no known issues. There was no laceration. Patient describes vaginal bleeding as intermittent brown to bright red spotting.  Patient is breastfeeding.  She desires nothing for contraception.      She would like to discuss the following complaints today: none.    Patient denies concerns for postpartum depression/anxiety. Patient denies  suicidal or homicidal ideation. Her postpartum depression screening questionnaire: 3. No treatment is indicated      Last Pap : 2022. Results: negative. HPV: negative.   Last Completed Pap Smear    This patient has no relevant Health Maintenance data.           The additional following portions of the patient's history were reviewed and updated as appropriate: allergies and current medications.    Review of Systems   All other systems reviewed and are negative.    All other systems reviewed and are negative.     I have reviewed and agree with the HPI, ROS, and historical information as entered above. Chely Kulkarni, APRN      /72 (BP Location: Left arm, Patient Position: Sitting, Cuff Size: Adult)   Ht 167.6 cm (65.98\")   Wt 71.1 kg (156 lb 12.8 oz)   Breastfeeding Yes   BMI 25.32 kg/m²     Physical Exam  Vitals and nursing note reviewed. Exam conducted with a chaperone present.   Constitutional:       Appearance: She is well-developed.   HENT:      Head: Normocephalic and atraumatic.   Pulmonary:      Effort: Pulmonary effort is normal.   Abdominal:      Palpations: Abdomen is soft. Abdomen is not rigid.   Genitourinary:     General: Normal vulva.      Exam position: Lithotomy " position.      Vagina: No lesions or prolapsed vaginal walls.      Cervix: No lesion or erythema.      Uterus: Enlarged. Not tender and no uterine prolapse.       Adnexa:         Right: No mass, tenderness or fullness.          Left: No mass, tenderness or fullness.        Comments: Brown blood  Musculoskeletal:      Cervical back: Normal range of motion.   Neurological:      Mental Status: She is alert and oriented to person, place, and time.   Psychiatric:         Mood and Affect: Mood normal.         Behavior: Behavior normal.             Assessment and Plan    Problem List Items Addressed This Visit    None  Visit Diagnoses         6 weeks postpartum follow-up    -  Primary      Breastfeeding (infant)        Relevant Medications    prenatal vitamins (PRENATABS RX) 29-1 MG tablet tablet      Papanicolaou smear        Relevant Orders    LIQUID-BASED PAP SMEAR WITH HPV GENOTYPING REGARDLESS OF INTERPRETATION ([facility]L,[facility])            S/p Vaginal delivery, 8 week(s) postpartum.  Doing well.    Return to normal physical activity.  No pelvic restrictions.   Baby doing well.  Breastfeeding going well.  No si/sx of postpartum depression  Lactation information given  Contraception: contraceptive methods: Condoms  Pap smear done today.  Follow up PRN/Annual    Chely Kulkarni, SUZI  06/30/2025

## 2025-07-01 LAB — REF LAB TEST METHOD: NORMAL
